# Patient Record
Sex: FEMALE | Race: WHITE | NOT HISPANIC OR LATINO | Employment: FULL TIME | URBAN - METROPOLITAN AREA
[De-identification: names, ages, dates, MRNs, and addresses within clinical notes are randomized per-mention and may not be internally consistent; named-entity substitution may affect disease eponyms.]

---

## 2020-11-02 ENCOUNTER — OFFICE VISIT (OUTPATIENT)
Dept: CARDIOLOGY | Facility: CLINIC | Age: 40
End: 2020-11-02

## 2020-11-02 VITALS
SYSTOLIC BLOOD PRESSURE: 131 MMHG | HEART RATE: 71 BPM | OXYGEN SATURATION: 97 % | DIASTOLIC BLOOD PRESSURE: 85 MMHG | WEIGHT: 272 LBS

## 2020-11-02 DIAGNOSIS — I95.1 AUTONOMIC ORTHOSTATIC HYPOTENSION: ICD-10-CM

## 2020-11-02 DIAGNOSIS — R55 SYNCOPE AND COLLAPSE: Primary | ICD-10-CM

## 2020-11-02 PROCEDURE — 99204 OFFICE O/P NEW MOD 45 MIN: CPT | Performed by: NURSE PRACTITIONER

## 2020-11-02 RX ORDER — ATENOLOL 100 MG/1
TABLET ORAL
COMMUNITY
Start: 2020-10-20

## 2020-11-02 RX ORDER — OXYCODONE HYDROCHLORIDE 10 MG/1
10 TABLET ORAL EVERY 6 HOURS
COMMUNITY
Start: 2020-10-20

## 2020-11-02 RX ORDER — FLUOXETINE HYDROCHLORIDE 40 MG/1
CAPSULE ORAL
COMMUNITY
Start: 2020-10-16

## 2020-11-02 RX ORDER — AMLODIPINE BESYLATE 10 MG/1
5 TABLET ORAL DAILY
COMMUNITY
Start: 2020-10-18 | End: 2020-11-02 | Stop reason: SDUPTHER

## 2020-11-02 RX ORDER — FUROSEMIDE 20 MG/1
TABLET ORAL
COMMUNITY
Start: 2020-10-20

## 2020-11-02 RX ORDER — FLUOXETINE HYDROCHLORIDE 20 MG/1
CAPSULE ORAL
COMMUNITY
Start: 2020-10-21

## 2020-11-02 RX ORDER — QUETIAPINE FUMARATE 200 MG/1
TABLET, FILM COATED ORAL
COMMUNITY
Start: 2020-10-15

## 2020-11-02 RX ORDER — TIZANIDINE 4 MG/1
TABLET ORAL
COMMUNITY
Start: 2020-10-21

## 2020-11-02 RX ORDER — AMLODIPINE BESYLATE 10 MG/1
5 TABLET ORAL DAILY
Qty: 30 TABLET | Refills: 5 | Status: SHIPPED | OUTPATIENT
Start: 2020-11-02

## 2020-11-02 NOTE — PROGRESS NOTES
"Cardiology Office Consultation      Encounter Date:  11/02/2020    Patient ID:   Princess Gomez is a 40 y.o. female.    Reason For Consultation:  Syncope and collapse  Hypotension    History of Present Illness:  Dear  Mis Linton APRN  It was my pleasure to see Princess in new consultation today.  As you are aware, she is a 40-year-old  female with no known history of premature coronary artery disease.  She has had no prior cardiac work-up.  She has known past medical history of hypertension and dependent edema.  The patient reports approximately 5 weeks ago she had rather sudden onset of feeling very weak and tired, decreased energy and noted her blood pressure was low with systolic reading in the 90s, diastolic 40-50s.  She discontinued amlodipine, atenolol and Lasix.  She stated for 5-7 days she continued to have symptoms of feeling weak, tired with low blood pressures.  During 1 of these days she had an episode of syncope as she was getting out of bed to stand up.  Another episode during this timeframe she was sitting on the toilet and \"lost track of time\".  She then came to.  She denies any actual syncope.  She denies any palpitations, chest pain, pressure or tightness.  She denies any shortness of breath at rest, dyspnea with exertion.  No recent edema.  She had 24-hour Holter monitoring performed while she was having symptoms.  Review of Holter monitoring shows significant amount of ectopy that appears to be interpreted as abnormal rhythms.  Underlying rhythm appears to be sinus.  The patient stated she restarted atenolol and Lasix.  She was taking amlodipine as needed when she developed a headache and noted increased blood pressure.      Assessment & Plan    Impressions:  Syncope and collapse  Hypotension  History of hypertension    Recommendations:  Mobile ambulatory monitor x7 days  2D echocardiogram  Decrease amlodipine to 5 mg once daily  Continue atenolol  Follow-up after " testing    Objective:    Vitals:  Vitals:    11/02/20 1412   BP: 131/85   Pulse: 71   SpO2: 97%   Weight: 123 kg (272 lb)       ROS    Review of Systems:  The following systems were reviewed as they relate to the cardiovascular system: Constitutional, Eyes, ENT, Cardiovascular, Respiratory, Gastrointestinal, Integumentary, Neurological, Psychiatric, Hematologic, Endocrine, Musculoskeletal, and Genitourinary. The pertinent cardiovascular findings are reported above with all other cardiovascular points within those systems being negative.    Physical Exam:     General: Alert, cooperative, no distress, appears stated age  Head:  Normocephalic, atraumatic, mucous membranes moist  Eyes:  Conjunctiva/corneas clear, EOM's intact     Neck:  Supple,  no adenopathy;      Lungs: Clear to auscultation bilaterally, no wheezes rhonchi rales are noted  Chest wall: No tenderness  Heart::  Regular rate and rhythm, S1 and S2 normal, no murmur, rub or gallop  Musculoskeletal:   Ambulates freely without assistance  Abdomen: Soft, non-tender, nondistended bowel sounds active  Extremities: No cyanosis, clubbing, or edema  Pulses: 2+ and symmetric all extremities  Skin:  No rashes or lesions  Neuro/psych: A&O x3. CN II through XII are grossly intact with appropriate affect    History of Present Illness    Procedures    Allergies:  Allergies   Allergen Reactions   • Iodine Other (See Comments)     Other         Medication Review:     Current Outpatient Medications:   •  amLODIPine (NORVASC) 10 MG tablet, Take 0.5 tablets by mouth Daily., Disp: 30 tablet, Rfl: 5  •  atenolol (TENORMIN) 100 MG tablet, , Disp: , Rfl:   •  FLUoxetine (PROzac) 20 MG capsule, , Disp: , Rfl:   •  FLUoxetine (PROzac) 40 MG capsule, , Disp: , Rfl:   •  furosemide (LASIX) 20 MG tablet, , Disp: , Rfl:   •  oxyCODONE (ROXICODONE) 10 MG tablet, Take 10 mg by mouth Every 6 (Six) Hours., Disp: , Rfl:   •  QUEtiapine (SEROquel) 200 MG tablet, , Disp: , Rfl:   •   "tiZANidine (ZANAFLEX) 4 MG tablet, , Disp: , Rfl:     Family History:  Family History   Problem Relation Age of Onset   • Hypertension Father        Past Medical History:  History reviewed. No pertinent past medical history.    Past surgical History:  Past Surgical History:   Procedure Laterality Date   • APPENDECTOMY         Social History:  Social History     Socioeconomic History   • Marital status: Single     Spouse name: Not on file   • Number of children: Not on file   • Years of education: Not on file   • Highest education level: Not on file   Tobacco Use   • Smoking status: Never Smoker   • Smokeless tobacco: Never Used   Substance and Sexual Activity   • Alcohol use: Never     Frequency: Never   • Drug use: Never           NOTE: The following portions of the patient's history were reviewed and updated this visit as appropriate: allergies, current medications, past family history, past medical history, past social history, past surgical history and problem list.    EMR Dragon/Transcription:   \"Dictated utilizing Dragon dictation\".   "

## 2020-11-10 ENCOUNTER — APPOINTMENT (OUTPATIENT)
Dept: CARDIOLOGY | Facility: HOSPITAL | Age: 40
End: 2020-11-10

## 2021-11-22 RX ORDER — AMLODIPINE BESYLATE 10 MG/1
TABLET ORAL
Qty: 15 TABLET | OUTPATIENT
Start: 2021-11-22

## 2023-03-08 ENCOUNTER — APPOINTMENT (OUTPATIENT)
Dept: CT IMAGING | Facility: HOSPITAL | Age: 43
End: 2023-03-08
Payer: COMMERCIAL

## 2023-03-08 ENCOUNTER — APPOINTMENT (OUTPATIENT)
Dept: GENERAL RADIOLOGY | Facility: HOSPITAL | Age: 43
End: 2023-03-08
Payer: COMMERCIAL

## 2023-03-08 ENCOUNTER — HOSPITAL ENCOUNTER (EMERGENCY)
Facility: HOSPITAL | Age: 43
Discharge: HOME | End: 2023-03-08
Attending: EMERGENCY MEDICINE | Admitting: EMERGENCY MEDICINE
Payer: COMMERCIAL

## 2023-03-08 VITALS
HEIGHT: 66 IN | WEIGHT: 240 LBS | BODY MASS INDEX: 38.57 KG/M2 | RESPIRATION RATE: 12 BRPM | OXYGEN SATURATION: 96 % | TEMPERATURE: 97.5 F | SYSTOLIC BLOOD PRESSURE: 158 MMHG | DIASTOLIC BLOOD PRESSURE: 89 MMHG | HEART RATE: 82 BPM

## 2023-03-08 DIAGNOSIS — R05.1 ACUTE COUGH: ICD-10-CM

## 2023-03-08 DIAGNOSIS — R42 DIZZINESS: ICD-10-CM

## 2023-03-08 DIAGNOSIS — N20.0 RENAL STONE: ICD-10-CM

## 2023-03-08 DIAGNOSIS — R19.7 DIARRHEA, UNSPECIFIED TYPE: ICD-10-CM

## 2023-03-08 DIAGNOSIS — J18.9 PNEUMONIA OF BOTH LUNGS DUE TO INFECTIOUS ORGANISM, UNSPECIFIED PART OF LUNG: Primary | ICD-10-CM

## 2023-03-08 LAB
ALBUMIN SERPL-MCNC: 4 G/DL (ref 3.5–5.2)
ALBUMIN/GLOB SERPL: 1.6 G/DL
ALP SERPL-CCNC: 78 U/L (ref 39–117)
ALT SERPL W P-5'-P-CCNC: 18 U/L (ref 1–33)
ANION GAP SERPL CALCULATED.3IONS-SCNC: 8.9 MMOL/L (ref 5–15)
AST SERPL-CCNC: 15 U/L (ref 1–32)
B-HCG UR QL: NEGATIVE
BASOPHILS # BLD AUTO: 0.03 10*3/MM3 (ref 0–0.2)
BASOPHILS NFR BLD AUTO: 0.3 % (ref 0–1.5)
BILIRUB SERPL-MCNC: <0.2 MG/DL (ref 0–1.2)
BILIRUB UR QL STRIP: NEGATIVE
BUN SERPL-MCNC: 14 MG/DL (ref 6–20)
BUN/CREAT SERPL: 15.9 (ref 7–25)
CALCIUM SPEC-SCNC: 8.9 MG/DL (ref 8.6–10.5)
CHLORIDE SERPL-SCNC: 113 MMOL/L (ref 98–107)
CLARITY UR: CLEAR
CO2 SERPL-SCNC: 20.1 MMOL/L (ref 22–29)
COLOR UR: YELLOW
CREAT SERPL-MCNC: 0.88 MG/DL (ref 0.57–1)
DEPRECATED RDW RBC AUTO: 43.2 FL (ref 37–54)
EGFRCR SERPLBLD CKD-EPI 2021: 82.7 ML/MIN/1.73
EOSINOPHIL # BLD AUTO: 0.21 10*3/MM3 (ref 0–0.4)
EOSINOPHIL NFR BLD AUTO: 2.2 % (ref 0.3–6.2)
ERYTHROCYTE [DISTWIDTH] IN BLOOD BY AUTOMATED COUNT: 12.9 % (ref 12.3–15.4)
GLOBULIN UR ELPH-MCNC: 2.5 GM/DL
GLUCOSE SERPL-MCNC: 104 MG/DL (ref 65–99)
GLUCOSE UR STRIP-MCNC: NEGATIVE MG/DL
HCT VFR BLD AUTO: 42.4 % (ref 34–46.6)
HGB BLD-MCNC: 13.8 G/DL (ref 12–15.9)
HGB UR QL STRIP.AUTO: NEGATIVE
IMM GRANULOCYTES # BLD AUTO: 0.11 10*3/MM3 (ref 0–0.05)
IMM GRANULOCYTES NFR BLD AUTO: 1.2 % (ref 0–0.5)
KETONES UR QL STRIP: NEGATIVE
LEUKOCYTE ESTERASE UR QL STRIP.AUTO: NEGATIVE
LIPASE SERPL-CCNC: 35 U/L (ref 13–60)
LYMPHOCYTES # BLD AUTO: 1.53 10*3/MM3 (ref 0.7–3.1)
LYMPHOCYTES NFR BLD AUTO: 16.3 % (ref 19.6–45.3)
MCH RBC QN AUTO: 29.2 PG (ref 26.6–33)
MCHC RBC AUTO-ENTMCNC: 32.5 G/DL (ref 31.5–35.7)
MCV RBC AUTO: 89.8 FL (ref 79–97)
MONOCYTES # BLD AUTO: 0.6 10*3/MM3 (ref 0.1–0.9)
MONOCYTES NFR BLD AUTO: 6.4 % (ref 5–12)
NEUTROPHILS NFR BLD AUTO: 6.88 10*3/MM3 (ref 1.7–7)
NEUTROPHILS NFR BLD AUTO: 73.6 % (ref 42.7–76)
NITRITE UR QL STRIP: NEGATIVE
PH UR STRIP.AUTO: 6 [PH] (ref 5–8)
PLATELET # BLD AUTO: 395 10*3/MM3 (ref 140–450)
PMV BLD AUTO: 9.5 FL (ref 6–12)
POTASSIUM SERPL-SCNC: 3.7 MMOL/L (ref 3.5–5.2)
PROT SERPL-MCNC: 6.5 G/DL (ref 6–8.5)
PROT UR QL STRIP: ABNORMAL
RBC # BLD AUTO: 4.72 10*6/MM3 (ref 3.77–5.28)
SODIUM SERPL-SCNC: 142 MMOL/L (ref 136–145)
SP GR UR STRIP: >=1.03 (ref 1–1.03)
UROBILINOGEN UR QL STRIP: ABNORMAL
WBC NRBC COR # BLD: 9.36 10*3/MM3 (ref 3.4–10.8)

## 2023-03-08 PROCEDURE — 71250 CT THORAX DX C-: CPT

## 2023-03-08 PROCEDURE — 71046 X-RAY EXAM CHEST 2 VIEWS: CPT

## 2023-03-08 PROCEDURE — 99283 EMERGENCY DEPT VISIT LOW MDM: CPT

## 2023-03-08 PROCEDURE — 80053 COMPREHEN METABOLIC PANEL: CPT

## 2023-03-08 PROCEDURE — 99284 EMERGENCY DEPT VISIT MOD MDM: CPT

## 2023-03-08 PROCEDURE — 94640 AIRWAY INHALATION TREATMENT: CPT

## 2023-03-08 PROCEDURE — 96374 THER/PROPH/DIAG INJ IV PUSH: CPT

## 2023-03-08 PROCEDURE — 85025 COMPLETE CBC W/AUTO DIFF WBC: CPT

## 2023-03-08 PROCEDURE — G0463 HOSPITAL OUTPT CLINIC VISIT: HCPCS

## 2023-03-08 PROCEDURE — 81003 URINALYSIS AUTO W/O SCOPE: CPT

## 2023-03-08 PROCEDURE — EDLOS

## 2023-03-08 PROCEDURE — 96361 HYDRATE IV INFUSION ADD-ON: CPT

## 2023-03-08 PROCEDURE — 83690 ASSAY OF LIPASE: CPT

## 2023-03-08 PROCEDURE — 25010000002 ONDANSETRON PER 1 MG

## 2023-03-08 PROCEDURE — 81025 URINE PREGNANCY TEST: CPT

## 2023-03-08 RX ORDER — IPRATROPIUM BROMIDE AND ALBUTEROL SULFATE 2.5; .5 MG/3ML; MG/3ML
3 SOLUTION RESPIRATORY (INHALATION) ONCE
Status: COMPLETED | OUTPATIENT
Start: 2023-03-08 | End: 2023-03-08

## 2023-03-08 RX ORDER — ACETAMINOPHEN 500 MG
1000 TABLET ORAL ONCE
Status: COMPLETED | OUTPATIENT
Start: 2023-03-08 | End: 2023-03-08

## 2023-03-08 RX ORDER — AZITHROMYCIN 250 MG/1
TABLET, FILM COATED ORAL
Qty: 6 TABLET | Refills: 0 | Status: SHIPPED | OUTPATIENT
Start: 2023-03-08

## 2023-03-08 RX ORDER — CEFDINIR 300 MG/1
300 CAPSULE ORAL 2 TIMES DAILY
Qty: 14 CAPSULE | Refills: 0 | Status: SHIPPED | OUTPATIENT
Start: 2023-03-08 | End: 2023-03-15

## 2023-03-08 RX ORDER — BROMPHENIRAMINE MALEATE, PSEUDOEPHEDRINE HYDROCHLORIDE, AND DEXTROMETHORPHAN HYDROBROMIDE 2; 30; 10 MG/5ML; MG/5ML; MG/5ML
5 SYRUP ORAL 4 TIMES DAILY PRN
Qty: 473 ML | Refills: 0 | Status: SHIPPED | OUTPATIENT
Start: 2023-03-08

## 2023-03-08 RX ORDER — SODIUM CHLORIDE 0.9 % (FLUSH) 0.9 %
10 SYRINGE (ML) INJECTION AS NEEDED
Status: DISCONTINUED | OUTPATIENT
Start: 2023-03-08 | End: 2023-03-08 | Stop reason: HOSPADM

## 2023-03-08 RX ORDER — ONDANSETRON 2 MG/ML
4 INJECTION INTRAMUSCULAR; INTRAVENOUS ONCE
Status: COMPLETED | OUTPATIENT
Start: 2023-03-08 | End: 2023-03-08

## 2023-03-08 RX ADMIN — ONDANSETRON 4 MG: 2 INJECTION INTRAMUSCULAR; INTRAVENOUS at 18:31

## 2023-03-08 RX ADMIN — ACETAMINOPHEN 1000 MG: 500 TABLET, FILM COATED ORAL at 18:58

## 2023-03-08 RX ADMIN — IPRATROPIUM BROMIDE AND ALBUTEROL SULFATE 3 ML: 2.5; .5 SOLUTION RESPIRATORY (INHALATION) at 18:33

## 2023-03-08 RX ADMIN — SODIUM CHLORIDE 1000 ML: 9 INJECTION, SOLUTION INTRAVENOUS at 18:31

## 2023-03-08 NOTE — ED NOTES
Pt covid + on 2/25. Reports cough and headache and dizziness when walking. Pt pcp sent her over with concerns of dehydration. Pt also c/o diarrhea.

## 2023-03-09 NOTE — FSED PROVIDER NOTE
EMERGENCY DEPARTMENT ENCOUNTER    Room Number:  05/05  Date seen:  3/8/2023  Time seen: 19:00 EST  PCP: Mis Linton APRN  Historian: Patient    HPI:  Chief complaint: Cough  Context:Princess Gomez is a 43 y.o. female who presents to the ED with c/o cough.  Patient states that she tested positive for COVID on February 25.  She also reports that she tested positive again yesterday.  She reports that she has been having a persistent nonproductive cough.  She reports that she has also been having diarrhea since she tested positive for COVID back in February.  She denies any abdominal pain.  She reports that she has been having some dizziness episodes.  She reports that her primary care provider told her to come in today due to her dizziness.  The patient is nontoxic in appearance.    Timing: Intermittent  Duration: Since February 25  Location: Chest  Radiation: Nonradiating  Quality: Dry  Intensity/Severity: Mild to moderate   Associated Symptoms: dizziness cough  Aggravating Factors: Worse with coughing      The patient was placed in a mask in triage, hand hygiene was performed before and after my interaction with the patient.  I wore a mask, safety glasses and gloves during my entire interaction with the patient.    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     Resolved Ambulatory Problems     Diagnosis Date Noted   • No Resolved Ambulatory Problems     No Additional Past Medical History       PAST SURGICAL HISTORY  Past Surgical History:   Procedure Laterality Date   • APPENDECTOMY         FAMILY HISTORY  Family History   Problem Relation Age of Onset   • Hypertension Father        SOCIAL HISTORY  Social History     Socioeconomic History   • Marital status: Single   Tobacco Use   • Smoking status: Never   • Smokeless  tobacco: Never   Substance and Sexual Activity   • Alcohol use: Never   • Drug use: Never       REVIEW OF SYSTEMS  Review of Systems    All systems reviewed and negative except for those discussed in HPI.     PHYSICAL EXAM    I have reviewed the triage vital signs and nursing notes.    ED Triage Vitals [03/08/23 1752]   Temp Heart Rate Resp BP SpO2   97.5 °F (36.4 °C) 89 12 112/75 91 %      Temp src Heart Rate Source Patient Position BP Location FiO2 (%)   Temporal -- -- -- --       Physical Exam  Constitutional:       Appearance: Normal appearance. She is obese.   HENT:      Right Ear: Tympanic membrane, ear canal and external ear normal.      Left Ear: Tympanic membrane, ear canal and external ear normal.      Nose: Nose normal.      Mouth/Throat:      Mouth: Mucous membranes are moist.   Eyes:      Pupils: Pupils are equal, round, and reactive to light.   Cardiovascular:      Rate and Rhythm: Normal rate and regular rhythm.      Pulses: Normal pulses.      Heart sounds: Normal heart sounds.   Pulmonary:      Effort: Pulmonary effort is normal. No respiratory distress.      Breath sounds: Normal breath sounds. No wheezing.   Abdominal:      Palpations: Abdomen is soft.   Musculoskeletal:         General: Normal range of motion.      Cervical back: Normal range of motion.   Skin:     General: Skin is warm.   Neurological:      Mental Status: She is alert.   Psychiatric:         Mood and Affect: Mood normal.         Behavior: Behavior normal.         Vital signs and nursing notes reviewed.        LAB RESULTS  Recent Results (from the past 24 hour(s))   Comprehensive Metabolic Panel    Collection Time: 03/08/23  6:23 PM    Specimen: Blood   Result Value Ref Range    Glucose 104 (H) 65 - 99 mg/dL    BUN 14 6 - 20 mg/dL    Creatinine 0.88 0.57 - 1.00 mg/dL    Sodium 142 136 - 145 mmol/L    Potassium 3.7 3.5 - 5.2 mmol/L    Chloride 113 (H) 98 - 107 mmol/L    CO2 20.1 (L) 22.0 - 29.0 mmol/L    Calcium 8.9 8.6 - 10.5  mg/dL    Total Protein 6.5 6.0 - 8.5 g/dL    Albumin 4.0 3.5 - 5.2 g/dL    ALT (SGPT) 18 1 - 33 U/L    AST (SGOT) 15 1 - 32 U/L    Alkaline Phosphatase 78 39 - 117 U/L    Total Bilirubin <0.2 0.0 - 1.2 mg/dL    Globulin 2.5 gm/dL    A/G Ratio 1.6 g/dL    BUN/Creatinine Ratio 15.9 7.0 - 25.0    Anion Gap 8.9 5.0 - 15.0 mmol/L    eGFR 82.7 >60.0 mL/min/1.73   Lipase    Collection Time: 03/08/23  6:23 PM    Specimen: Blood   Result Value Ref Range    Lipase 35 13 - 60 U/L   CBC Auto Differential    Collection Time: 03/08/23  6:23 PM    Specimen: Blood   Result Value Ref Range    WBC 9.36 3.40 - 10.80 10*3/mm3    RBC 4.72 3.77 - 5.28 10*6/mm3    Hemoglobin 13.8 12.0 - 15.9 g/dL    Hematocrit 42.4 34.0 - 46.6 %    MCV 89.8 79.0 - 97.0 fL    MCH 29.2 26.6 - 33.0 pg    MCHC 32.5 31.5 - 35.7 g/dL    RDW 12.9 12.3 - 15.4 %    RDW-SD 43.2 37.0 - 54.0 fl    MPV 9.5 6.0 - 12.0 fL    Platelets 395 140 - 450 10*3/mm3    Neutrophil % 73.6 42.7 - 76.0 %    Lymphocyte % 16.3 (L) 19.6 - 45.3 %    Monocyte % 6.4 5.0 - 12.0 %    Eosinophil % 2.2 0.3 - 6.2 %    Basophil % 0.3 0.0 - 1.5 %    Immature Grans % 1.2 (H) 0.0 - 0.5 %    Neutrophils, Absolute 6.88 1.70 - 7.00 10*3/mm3    Lymphocytes, Absolute 1.53 0.70 - 3.10 10*3/mm3    Monocytes, Absolute 0.60 0.10 - 0.90 10*3/mm3    Eosinophils, Absolute 0.21 0.00 - 0.40 10*3/mm3    Basophils, Absolute 0.03 0.00 - 0.20 10*3/mm3    Immature Grans, Absolute 0.11 (H) 0.00 - 0.05 10*3/mm3   Urinalysis With Culture If Indicated - Urine, Clean Catch    Collection Time: 03/08/23  7:23 PM    Specimen: Urine, Clean Catch   Result Value Ref Range    Color, UA Yellow Yellow, Straw    Appearance, UA Clear Clear    pH, UA 6.0 5.0 - 8.0    Specific Gravity, UA >=1.030 1.005 - 1.030    Glucose, UA Negative Negative    Ketones, UA Negative Negative    Bilirubin, UA Negative Negative    Blood, UA Negative Negative    Protein, UA Trace (A) Negative    Leuk Esterase, UA Negative Negative    Nitrite, UA  Negative Negative    Urobilinogen, UA 0.2 E.U./dL 0.2 - 1.0 E.U./dL   Pregnancy, Urine - Urine, Clean Catch    Collection Time: 03/08/23  7:23 PM    Specimen: Urine, Clean Catch   Result Value Ref Range    HCG, Urine QL Negative Negative       Ordered the above labs and independently reviewed the results.      RADIOLOGY RESULTS  CT Chest Without Contrast Diagnostic    Result Date: 3/8/2023  CT CHEST WO CONTRAST DIAGNOSTIC Date of Exam: 3/8/2023 6:55 PM EST Indication: X-ray findings right lung base.  Cough. Comparison: None available. Technique: Axial CT images were obtained of the chest without contrast administration.  Sagittal and coronal reconstructions were performed.  Automated exposure control and iterative reconstruction methods were used. Findings: *Heart: Heart size normal without pericardial effusion. *Lymphadenopathy: Mild mediastinal lymphadenopathy. *Lungs: Multifocal patchy infiltrates bilaterally, worse throughout right lung. *Pneumothorax: None. *Bones: No acute fractures or dislocations. No osseous destructive lesions. * *    1.No acute findings. 2.Partially visualized 4 mm left renal stone. 3.Multifocal patchy infiltrates/pneumonia bilaterally, worse throughout right lung. Mild mediastinal lymphadenopathy. Follow-up recommended to document resolution and exclude malignancy. Electronically Signed: Ricebook  3/8/2023 7:26 PM EST  Workstation ID: [id number]    XR Chest PA & Lateral    Result Date: 3/8/2023  XR CHEST PA AND LATERAL Date of Exam: 3/8/2023 6:05 PM EST Indication: cough. Comparison: None available. Findings: LUNGS: 3.2 x 4.4 cm focal consolidation in the right lung base laterally. PNEUMOTHORAX: None.  HEART SIZE: Normal.     1.3.2 x 4.4 cm focal consolidation in the right lung base laterally.  Follow-up recommended to document resolution and exclude malignancy. 2.A few other tiny nodular opacities are identified in right lung laterally.  Electronically Signed: Yovani Ali  3/8/2023  6:30 PM EST  Workstation ID: [id number]         I ordered the above noted radiological studies. Independently reviewed by me and discussed with radiologist.  See dictation above for official radiology interpretation.      Orders placed during this visit:  Orders Placed This Encounter   Procedures   • XR Chest PA & Lateral   • CT Chest Without Contrast Diagnostic   • Comprehensive Metabolic Panel   • Lipase   • CBC Auto Differential   • Urinalysis With Culture If Indicated - Urine, Clean Catch   • Pregnancy, Urine - Urine, Clean Catch   • Orthostatic Vitals   • Insert peripheral IV   • CBC & Differential   • ED Acknowledgement Form Needed;         ED Course as of 03/08/23 2030   Wed Mar 08, 2023   1847 XR Chest PA & Lateral  IMPRESSION:  1.3.2 x 4.4 cm focal consolidation in the right lung base laterally.  Follow-up recommended to document resolution and exclude malignancy.  2.A few other tiny nodular opacities are identified in right lung laterally.        Electronically Signed: Yovani Ali    3/8/2023 6:30 PM EST  [KJ]   1950 CT Chest Without Contrast Diagnostic  IMPRESSION:  1.No acute findings.  2.Partially visualized 4 mm left renal stone.  3.Multifocal patchy infiltrates/pneumonia bilaterally, worse throughout right lung. Mild mediastinal lymphadenopathy. Follow-up recommended to document resolution and exclude malignancy.                    Electronically Signed: Yovani Ali    3/8/2023 7:26 PM EST  [KJ]      ED Course User Index  [KJ] Kiya Barbosa APRN           PROCEDURES    Procedures        MEDICATIONS GIVEN IN ER    Medications   sodium chloride 0.9 % flush 10 mL (has no administration in time range)   sodium chloride 0.9 % bolus 1,000 mL (0 mL Intravenous Stopped 3/8/23 1908)   ondansetron (ZOFRAN) injection 4 mg (4 mg Intravenous Given 3/8/23 1831)   ipratropium-albuterol (DUO-NEB) nebulizer solution 3 mL (3 mL Nebulization Given 3/8/23 1833)   acetaminophen (TYLENOL) tablet 1,000 mg (1,000 mg  Oral Given 3/8/23 1858)         PROGRESS, DATA ANALYSIS, CONSULTS, AND MEDICAL DECISION MAKING    All labs have been independently reviewed by me.  All radiology studies have been reviewed by me.   EKG's independently reviewed by me.  Discussion below represents my analysis of pertinent findings related to patient's condition, differential diagnosis, treatment plan and final disposition.    I rechecked the patient.  I discussed the patient's labs, radiology findings (including all incidental findings), diagnosis, and plan for discharge.  A repeat exam reveals no new worrisome changes from my initial exam findings.  The patient understands that the fact that they are being discharged does not denote that nothing is abnormal, it indicates that no clinical emergency is present and that they must follow-up as directed in order to properly maintain their health.  Follow-up instructions (specifically listed below) and return to ER precautions were given at this time.  I specifically instructed the patient to follow-up with their PCP.  The patient understands and agrees with the plan, and is ready for discharge.  All questions answered.    ED Course as of 03/08/23 2030   Wed Mar 08, 2023   1847 XR Chest PA & Lateral  IMPRESSION:  1.3.2 x 4.4 cm focal consolidation in the right lung base laterally.  Follow-up recommended to document resolution and exclude malignancy.  2.A few other tiny nodular opacities are identified in right lung laterally.        Electronically Signed: Yovani Amanda    3/8/2023 6:30 PM EST  [KJ]   1950 CT Chest Without Contrast Diagnostic  IMPRESSION:  1.No acute findings.  2.Partially visualized 4 mm left renal stone.  3.Multifocal patchy infiltrates/pneumonia bilaterally, worse throughout right lung. Mild mediastinal lymphadenopathy. Follow-up recommended to document resolution and exclude malignancy.                    Electronically Signed: Yovani Amanda    3/8/2023 7:26 PM EST  [KJ]      ED Course  User Index  [KJ] Kiya Barbosa, KINJAL       AS OF 20:30 EST VITALS:    BP - 158/89  HR - 82  TEMP - 97.5 °F (36.4 °C) (Temporal)  02 SATS - 96%    Medical Decision Making  MEDICAL DECISION  Comorbidities: Hypertension, depression  Differentials: Pneumonia, bronchitis, COVID, influenza; this list is not all inclusive and does not constitute the entirety of considered causes    The patient is a 43-year-old female who reports that she has been having a persistent cough since being diagnosed with COVID the end of February.  She reports that she did test positive yesterday as well.  The patient reports that she has been having some diarrhea and was sent in by her primary care provider today due to her intermittent dizziness.  She reports that she feels like she is dehydrated.  She denies any chest pain or shortness of breath.  She reports that she does have some mild discomfort when coughing.  The patient had an IV established and blood work was obtained.  CT scan showed a 4 mm renal stone with multifocal patchy infiltrates representing pneumonia.  They recommended follow-up CT scan for resolution of symptoms.  I discussed the discharge instructions with the patient and discussed the importance of following up with her primary care provider.  I will send her home with antibiotics and Bromfed for cough.  She is to return for any new or worsening symptoms.      I wore protective equipment throughout this patient encounter to include mask. Hand hygiene was performed before donning protective equipment and after removal when leaving the room.    Acute cough: acute illness or injury  Diarrhea, unspecified type: acute illness or injury  Dizziness: acute illness or injury  Pneumonia of both lungs due to infectious organism, unspecified part of lung: acute illness or injury  Amount and/or Complexity of Data Reviewed  Labs: ordered.  Radiology: ordered. Decision-making details documented in ED Course.      Risk  OTC  drugs.  Prescription drug management.              DIAGNOSIS  Final diagnoses:   Pneumonia of both lungs due to infectious organism, unspecified part of lung   Acute cough   Dizziness   Diarrhea, unspecified type   Renal stone         Pt masked in first look. I wore a surgical mask throughout my encounters with the pt. I performed hand hygiene on entry into the pt room and upon exit.     Dictated utilizing Dragon dictation     Note Disclaimer: At [facility], we believe that sharing information builds trust and better relationships. You are receiving this note because you recently visited [facility]. It is possible you will see health information before a provider has talked with you about it. This kind of information can be easy to misunderstand. To help you fully understand what it means for your health, we urge you to discuss this note with your provider.

## 2023-03-23 ENCOUNTER — INPATIENT HOSPITAL (OUTPATIENT)
Dept: URBAN - METROPOLITAN AREA HOSPITAL 76 | Facility: HOSPITAL | Age: 43
End: 2023-03-23
Payer: MEDICARE

## 2023-03-23 DIAGNOSIS — K85.90 ACUTE PANCREATITIS WITHOUT NECROSIS OR INFECTION, UNSPECIFIE: ICD-10-CM

## 2023-03-23 PROCEDURE — 99222 1ST HOSP IP/OBS MODERATE 55: CPT

## 2023-03-24 ENCOUNTER — INPATIENT HOSPITAL (OUTPATIENT)
Dept: URBAN - METROPOLITAN AREA HOSPITAL 76 | Facility: HOSPITAL | Age: 43
End: 2023-03-24

## 2023-03-24 DIAGNOSIS — K85.90 ACUTE PANCREATITIS WITHOUT NECROSIS OR INFECTION, UNSPECIFIE: ICD-10-CM

## 2023-03-24 DIAGNOSIS — K80.20 CALCULUS OF GALLBLADDER WITHOUT CHOLECYSTITIS WITHOUT OBSTRU: ICD-10-CM

## 2023-03-24 DIAGNOSIS — Z90.49 ACQUIRED ABSENCE OF OTHER SPECIFIED PARTS OF DIGESTIVE TRACT: ICD-10-CM

## 2023-03-24 DIAGNOSIS — R10.11 RIGHT UPPER QUADRANT PAIN: ICD-10-CM

## 2023-03-24 DIAGNOSIS — R19.7 DIARRHEA, UNSPECIFIED: ICD-10-CM

## 2023-03-24 DIAGNOSIS — R10.13 EPIGASTRIC PAIN: ICD-10-CM

## 2023-03-24 DIAGNOSIS — D64.9 ANEMIA, UNSPECIFIED: ICD-10-CM

## 2023-03-24 DIAGNOSIS — D72.829 ELEVATED WHITE BLOOD CELL COUNT, UNSPECIFIED: ICD-10-CM

## 2023-03-24 DIAGNOSIS — Z86.16 PERSONAL HISTORY OF COVID-19: ICD-10-CM

## 2023-03-24 PROCEDURE — 99232 SBSQ HOSP IP/OBS MODERATE 35: CPT

## 2024-10-31 ENCOUNTER — CONSULT (OUTPATIENT)
Dept: CARDIOLOGY | Facility: CLINIC | Age: 44
End: 2024-10-31
Payer: COMMERCIAL

## 2024-10-31 VITALS
WEIGHT: 245.4 LBS | BODY MASS INDEX: 39.44 KG/M2 | HEART RATE: 71 BPM | OXYGEN SATURATION: 99 % | SYSTOLIC BLOOD PRESSURE: 127 MMHG | DIASTOLIC BLOOD PRESSURE: 86 MMHG | HEIGHT: 66 IN

## 2024-10-31 DIAGNOSIS — I10 ESSENTIAL HYPERTENSION: Primary | ICD-10-CM

## 2024-10-31 PROCEDURE — 99203 OFFICE O/P NEW LOW 30 MIN: CPT | Performed by: INTERNAL MEDICINE

## 2024-10-31 PROCEDURE — 93000 ELECTROCARDIOGRAM COMPLETE: CPT | Performed by: INTERNAL MEDICINE

## 2024-10-31 RX ORDER — SPIRONOLACTONE 100 MG/1
100 TABLET, FILM COATED ORAL 2 TIMES DAILY
COMMUNITY

## 2024-10-31 RX ORDER — OXYCODONE AND ACETAMINOPHEN 10; 325 MG/1; MG/1
1 TABLET ORAL EVERY 6 HOURS PRN
COMMUNITY

## 2024-10-31 RX ORDER — MONTELUKAST SODIUM 10 MG/1
10 TABLET ORAL NIGHTLY
COMMUNITY

## 2024-10-31 RX ORDER — PROPRANOLOL HCL 20 MG
20 TABLET ORAL 2 TIMES DAILY
COMMUNITY

## 2024-10-31 RX ORDER — LOSARTAN POTASSIUM 25 MG/1
25 TABLET ORAL DAILY
Qty: 90 TABLET | Refills: 3 | Status: SHIPPED | OUTPATIENT
Start: 2024-10-31

## 2024-10-31 RX ORDER — BUPROPION HYDROCHLORIDE 150 MG/1
150 TABLET, EXTENDED RELEASE ORAL 2 TIMES DAILY
COMMUNITY

## 2024-10-31 RX ORDER — LISDEXAMFETAMINE DIMESYLATE 10 MG/1
10 CAPSULE ORAL DAILY
COMMUNITY

## 2024-10-31 NOTE — PROGRESS NOTES
Cardiology Office Visit      Encounter Date:  10/31/2024    Patient ID:   Princess Gomez is a 44 y.o. female.    Reason For Followup:  Hypertension  Poorly controlled hypertension    Brief Clinical History:  Dear Mis Frye APRN    I had the pleasure of seeing Princess Gomez today. As you are well aware, this is a 44 y.o. female prior history of hypertension and poorly controlled blood pressure here for follow-up for further evaluation and treatment options    Interval History:  Denies any exertional symptoms of chest pain shortness of breath dizziness or syncope  Complaining of some nonspecific lower extremity edema that is better with the diuretic therapy  Denies any syncope  No heart failure symptoms      Assessment & Plan    Impressions:  Hypertension  Poorly controlled blood pressure  Twelve-lead EKG with normal sinus rhythm with no acute findings  CT chest with no acute findings  Recommendations:  Patient current medications include propranolol 20 mg p.o. twice daily patient is on atenolol 100 mg p.o. twice daily patient is on Aldactone 100 mg p.o. twice daily  Prior available medical records reviewed and discussed patient  Recommend low-salt diet  Patient is advised to consider decreasing and avoiding 2 different beta-blockers  Patient has been on these medicines for a long time she is not comfortable in stopping the medications  Start patient on losartan 25 mg p.o. once a day  Get a copy of the labs that were done with the primary care physician make sure renal function and potassium is normal  Risk benefits and alternatives reviewed and discussed patient  Eventually will like to decrease the dose of Aldactone and also atenolol in future  Close monitoring of blood pressure at home and write down the blood pressure readings  Follow-up in office in 2 months        Vitals:  Vitals:    10/31/24 0908   BP: 127/86   Pulse: 71   SpO2: 99%   Weight: 111 kg (245 lb 6.4 oz)   Height: 167.6 cm  "(66\")       Physical Exam:    General: Alert, cooperative, no distress, appears stated age  Head:  Normocephalic, atraumatic, mucous membranes moist  Eyes:  Conjunctiva/corneas clear, EOM's intact     Neck:  Supple,  no adenopathy;      Lungs: Clear to auscultation bilaterally, no wheezes rhonchi rales are noted  Chest wall: No tenderness  Heart::  Regular rate and rhythm, S1 and S2 normal, no murmur, rub or gallop  Abdomen: Soft, non-tender, nondistended bowel sounds active  Extremities: No cyanosis, clubbing, or edema  Pulses: 2+ and symmetric all extremities  Skin:  No rashes or lesions  Neuro/psych: A&O x3. CN II through XII are grossly intact with appropriate affect              Lab Results   Component Value Date    GLUCOSE 104 (H) 03/08/2023    BUN 14 03/08/2023    CREATININE 0.88 03/08/2023    EGFR 82.7 03/08/2023    BCR 15.9 03/08/2023    K 3.7 03/08/2023    CO2 20.1 (L) 03/08/2023    CALCIUM 8.9 03/08/2023    ALBUMIN 4.0 03/08/2023    BILITOT <0.2 03/08/2023    AST 15 03/08/2023    ALT 18 03/08/2023        No results found for this or any previous visit.     No results found for: \"CHOL\", \"CHLPL\", \"TRIG\", \"HDL\", \"LDL\", \"LDLDIRECT\"             Objective:          Allergies:  Allergies   Allergen Reactions    Iodine Other (See Comments)     Other         Medication Review:     Current Outpatient Medications:     atenolol (TENORMIN) 100 MG tablet, , Disp: , Rfl:     buPROPion SR (WELLBUTRIN SR) 150 MG 12 hr tablet, Take 1 tablet by mouth 2 (Two) Times a Day., Disp: , Rfl:     Cariprazine HCl (Vraylar) 1.5 MG capsule capsule, Take 1 capsule by mouth Daily., Disp: , Rfl:     esomeprazole (nexIUM) 20 MG capsule, Take 1 capsule by mouth Every Morning Before Breakfast. Take 2 capsules daily., Disp: , Rfl:     lisdexamfetamine dimesylate (Vyvanse) 10 MG capsule, Take 1 capsule by mouth Daily, Disp: , Rfl:     montelukast (SINGULAIR) 10 MG tablet, Take 1 tablet by mouth Every Night., Disp: , Rfl:     " oxyCODONE-acetaminophen (PERCOCET)  MG per tablet, Take 1 tablet by mouth Every 6 (Six) Hours As Needed for Moderate Pain., Disp: , Rfl:     propranolol (INDERAL) 20 MG tablet, Take 1 tablet by mouth 2 (Two) Times a Day., Disp: , Rfl:     QUEtiapine (SEROquel) 200 MG tablet, , Disp: , Rfl:     spironolactone (ALDACTONE) 100 MG tablet, Take 1 tablet by mouth 2 (Two) Times a Day., Disp: , Rfl:     tiZANidine (ZANAFLEX) 4 MG tablet, , Disp: , Rfl:     Family History:  Family History   Problem Relation Age of Onset    Hypertension Father        Past Medical History:  History reviewed. No pertinent past medical history.    Past surgical History:  Past Surgical History:   Procedure Laterality Date    APPENDECTOMY         Social History:  Social History     Socioeconomic History    Marital status: Single   Tobacco Use    Smoking status: Never    Smokeless tobacco: Never   Vaping Use    Vaping status: Never Used   Substance and Sexual Activity    Alcohol use: Never    Drug use: Never    Sexual activity: Not Currently       Review of Systems:  The following systems were reviewed as they relate to the cardiovascular system: Constitutional, Eyes, ENT, Cardiovascular, Respiratory, Gastrointestinal, Integumentary, Neurological, Psychiatric, Hematologic, Endocrine, Musculoskeletal, and Genitourinary. The pertinent cardiovascular findings are reported above with all other cardiovascular points within those systems being negative.    Diagnostic Study Review:     Current Electrocardiogram:    ECG 12 Lead    Date/Time: 10/31/2024 10:14 AM  Performed by: Ajit Lin MD    Authorized by: Ajit Lin MD  Comparison: compared with previous ECG   Similar to previous ECG  Rhythm: sinus rhythm  Rate: normal  BPM: 71  Conduction: conduction normal  QRS axis: normal  Other findings: non-specific ST-T wave changes    Clinical impression: abnormal EKG                NOTE: The following portions of the patient's  history were reviewed and updated this visit as appropriate: allergies, current medications, past family history, past medical history, past social history, past surgical history and problem list.   Answers submitted by the patient for this visit:  Primary Reason for Visit (Submitted on 10/29/2024)  What is the primary reason for your visit?: High Blood Pressure  High Blood Pressure Questionnaire (Submitted on 10/29/2024)  Chief Complaint: Hypertension  Chronicity: chronic  Onset: more than 1 year ago  Progression since onset: unchanged  Condition status: resistant  anxiety: Yes  blurred vision: Yes  malaise/fatigue: Yes  peripheral edema: Yes  Agents associated with hypertension: amphetamines  CAD risks: dyslipidemia, obesity, sedentary lifestyle, stress  Compliance problems: diet, exercise

## 2024-11-08 ENCOUNTER — OFFICE (OUTPATIENT)
Age: 44
End: 2024-11-08

## 2024-11-08 ENCOUNTER — OFFICE (OUTPATIENT)
Dept: URBAN - METROPOLITAN AREA CLINIC 64 | Facility: CLINIC | Age: 44
End: 2024-11-08

## 2024-11-08 VITALS
DIASTOLIC BLOOD PRESSURE: 93 MMHG | WEIGHT: 246 LBS | WEIGHT: 246 LBS | HEIGHT: 64 IN | HEIGHT: 64 IN | HEIGHT: 64 IN | HEART RATE: 75 BPM | HEIGHT: 64 IN | DIASTOLIC BLOOD PRESSURE: 93 MMHG | HEART RATE: 75 BPM | WEIGHT: 246 LBS | HEART RATE: 75 BPM | DIASTOLIC BLOOD PRESSURE: 93 MMHG | DIASTOLIC BLOOD PRESSURE: 93 MMHG | HEIGHT: 64 IN | WEIGHT: 246 LBS | HEIGHT: 64 IN | HEART RATE: 75 BPM | WEIGHT: 246 LBS | SYSTOLIC BLOOD PRESSURE: 136 MMHG | WEIGHT: 246 LBS | SYSTOLIC BLOOD PRESSURE: 136 MMHG | DIASTOLIC BLOOD PRESSURE: 93 MMHG | SYSTOLIC BLOOD PRESSURE: 136 MMHG | HEART RATE: 75 BPM | SYSTOLIC BLOOD PRESSURE: 136 MMHG | SYSTOLIC BLOOD PRESSURE: 136 MMHG | HEART RATE: 75 BPM | DIASTOLIC BLOOD PRESSURE: 93 MMHG | SYSTOLIC BLOOD PRESSURE: 136 MMHG | DIASTOLIC BLOOD PRESSURE: 93 MMHG | WEIGHT: 246 LBS | HEART RATE: 75 BPM | SYSTOLIC BLOOD PRESSURE: 136 MMHG | HEIGHT: 64 IN

## 2024-11-08 DIAGNOSIS — K21.9 GASTRO-ESOPHAGEAL REFLUX DISEASE WITHOUT ESOPHAGITIS: ICD-10-CM

## 2024-11-08 DIAGNOSIS — Z12.11 ENCOUNTER FOR SCREENING FOR MALIGNANT NEOPLASM OF COLON: ICD-10-CM

## 2024-11-08 DIAGNOSIS — R19.7 DIARRHEA, UNSPECIFIED: ICD-10-CM

## 2024-11-08 PROCEDURE — 99214 OFFICE O/P EST MOD 30 MIN: CPT | Performed by: INTERNAL MEDICINE

## 2024-11-12 DIAGNOSIS — I10 ESSENTIAL HYPERTENSION: Primary | ICD-10-CM

## 2025-01-23 ENCOUNTER — OFFICE VISIT (OUTPATIENT)
Dept: CARDIOLOGY | Facility: CLINIC | Age: 45
End: 2025-01-23
Payer: COMMERCIAL

## 2025-01-23 VITALS
HEIGHT: 66 IN | WEIGHT: 246 LBS | DIASTOLIC BLOOD PRESSURE: 88 MMHG | SYSTOLIC BLOOD PRESSURE: 118 MMHG | OXYGEN SATURATION: 98 % | BODY MASS INDEX: 39.53 KG/M2 | HEART RATE: 89 BPM

## 2025-01-23 DIAGNOSIS — I95.1 AUTONOMIC ORTHOSTATIC HYPOTENSION: Primary | ICD-10-CM

## 2025-01-23 DIAGNOSIS — E78.5 HYPERLIPIDEMIA LDL GOAL <70: ICD-10-CM

## 2025-01-23 DIAGNOSIS — I10 ESSENTIAL HYPERTENSION: ICD-10-CM

## 2025-01-23 DIAGNOSIS — R55 SYNCOPE AND COLLAPSE: ICD-10-CM

## 2025-01-23 RX ORDER — LISDEXAMFETAMINE DIMESYLATE 70 MG/1
70 CAPSULE ORAL EVERY MORNING
COMMUNITY

## 2025-01-23 NOTE — PROGRESS NOTES
Cardiology Office Visit      Encounter Date:  01/23/2025    Patient ID:   Princess Gomez is a 44 y.o. female.    Reason For Followup:  Hypertension  Hyperlipidemia    Brief Clinical History:  Dear Mis Frye APRN    I had the pleasure of seeing Princess Gomez today. As you are well aware, this is a 44 y.o. female prior history of hypertension and poorly controlled blood pressure here for follow-up for further evaluation and treatment options    Interval History:  Denies any exertional symptoms of chest pain shortness of breath dizziness or syncope  Denies any syncope  No heart failure symptoms  Intolerant to statins  Home blood pressure readings are much better  Patient was recently started on GLP-1 analogs for possible consideration for weight loss    Assessment & Plan    Impressions:  Hypertension  Poorly controlled blood pressure/pressure is much better controlled  Twelve-lead EKG with normal sinus rhythm with no acute findings  CT chest with no acute findings  Per lipidemia and intolerance to statins  Diabetes mellitus    Recommendations:  Continue current medical therapy  Continue aggressive risk factor modification  Need for regular exercise and weight loss reviewed and discussed patient  Recent labs and medications reviewed and discussed with patient  Renal function is normal  Potassium levels are normal  Lipids are not controlled  Patient is intolerant to statins  Recommend a PCSK9 repeat labs for hyperlipidemia  Continue GLP-1 analogs for weight loss  If there is any low blood pressures with a significant weight loss patient is advised to call back and consider decreasing the dose of the medications  Current medications include losartan 25 mg p.o. once a day patient is on propranolol 20 mg p.o. twice daily patient is on spironolactone 100 mg p.o. twice a day patient is on atenolol 100 mg p.o. once a day  Patient on semaglutide and also patient was started on Repatha this office  "visit  Close monitoring of blood pressure at home and write down the blood pressure readings  Follow-up in office in 6 months        Vitals:  Vitals:    01/23/25 0850   BP: 118/88   Pulse: 89   SpO2: 98%   Weight: 112 kg (246 lb)   Height: 167.6 cm (66\")       Physical Exam:    General: Alert, cooperative, no distress, appears stated age  Head:  Normocephalic, atraumatic, mucous membranes moist  Eyes:  Conjunctiva/corneas clear, EOM's intact     Neck:  Supple,  no adenopathy;      Lungs: Clear to auscultation bilaterally, no wheezes rhonchi rales are noted  Chest wall: No tenderness  Heart::  Regular rate and rhythm, S1 and S2 normal, no murmur, rub or gallop  Abdomen: Soft, non-tender, nondistended bowel sounds active  Extremities: No cyanosis, clubbing, or edema  Pulses: 2+ and symmetric all extremities  Skin:  No rashes or lesions  Neuro/psych: A&O x3. CN II through XII are grossly intact with appropriate affect              Lab Results   Component Value Date    GLUCOSE 104 (H) 03/08/2023    BUN 14 03/08/2023    CREATININE 0.88 03/08/2023    EGFR 82.7 03/08/2023    BCR 15.9 03/08/2023    K 3.7 03/08/2023    CO2 20.1 (L) 03/08/2023    CALCIUM 8.9 03/08/2023    ALBUMIN 4.0 03/08/2023    BILITOT <0.2 03/08/2023    AST 15 03/08/2023    ALT 18 03/08/2023        No results found for this or any previous visit.     No results found for: \"CHOL\", \"CHLPL\", \"TRIG\", \"HDL\", \"LDL\", \"LDLDIRECT\"             Objective:          Allergies:  Allergies   Allergen Reactions    Iodine Other (See Comments)     Other         Medication Review:     Current Outpatient Medications:     atenolol (TENORMIN) 100 MG tablet, , Disp: , Rfl:     buPROPion SR (WELLBUTRIN SR) 150 MG 12 hr tablet, Take 1 tablet by mouth 2 (Two) Times a Day., Disp: , Rfl:     esomeprazole (nexIUM) 20 MG capsule, Take 1 capsule by mouth Every Morning Before Breakfast. Take 2 capsules daily., Disp: , Rfl:     lisdexamfetamine (Vyvanse) 70 MG capsule, Take 1 capsule by " mouth Every Morning, Disp: , Rfl:     losartan (Cozaar) 25 MG tablet, Take 1 tablet by mouth Daily., Disp: 90 tablet, Rfl: 3    montelukast (SINGULAIR) 10 MG tablet, Take 1 tablet by mouth Every Night., Disp: , Rfl:     oxyCODONE-acetaminophen (PERCOCET)  MG per tablet, Take 1 tablet by mouth Every 6 (Six) Hours As Needed for Moderate Pain., Disp: , Rfl:     propranolol (INDERAL) 20 MG tablet, Take 1 tablet by mouth 2 (Two) Times a Day., Disp: , Rfl:     QUEtiapine (SEROquel) 200 MG tablet, , Disp: , Rfl:     Semaglutide-Weight Management 0.25 MG/0.5ML solution auto-injector, Inject 0.5 mL into the appropriate muscle as directed by prescriber 1 (One) Time Per Week., Disp: , Rfl:     spironolactone (ALDACTONE) 100 MG tablet, Take 1 tablet by mouth 2 (Two) Times a Day. (Patient taking differently: Take 1 tablet by mouth Daily.), Disp: , Rfl:     tiZANidine (ZANAFLEX) 4 MG tablet, , Disp: , Rfl:     Evolocumab (REPATHA) solution prefilled syringe injection, Inject 1 mL under the skin into the appropriate area as directed Every 14 (Fourteen) Days., Disp: 1 mL, Rfl: 5    Family History:  Family History   Problem Relation Age of Onset    Hypertension Father        Past Medical History:  History reviewed. No pertinent past medical history.    Past surgical History:  Past Surgical History:   Procedure Laterality Date    APPENDECTOMY         Social History:  Social History     Socioeconomic History    Marital status: Single   Tobacco Use    Smoking status: Never    Smokeless tobacco: Never   Vaping Use    Vaping status: Never Used   Substance and Sexual Activity    Alcohol use: Never    Drug use: Never    Sexual activity: Not Currently       Review of Systems:  The following systems were reviewed as they relate to the cardiovascular system: Constitutional, Eyes, ENT, Cardiovascular, Respiratory, Gastrointestinal, Integumentary, Neurological, Psychiatric, Hematologic, Endocrine, Musculoskeletal, and Genitourinary. The  pertinent cardiovascular findings are reported above with all other cardiovascular points within those systems being negative.    Diagnostic Study Review:     Current Electrocardiogram:  Procedures          NOTE: The following portions of the patient's history were reviewed and updated this visit as appropriate: allergies, current medications, past family history, past medical history, past social history, past surgical history and problem list.

## 2025-01-29 ENCOUNTER — ON CAMPUS - OUTPATIENT (AMBULATORY)
Dept: URBAN - METROPOLITAN AREA HOSPITAL 2 | Facility: HOSPITAL | Age: 45
End: 2025-01-29
Payer: MEDICARE

## 2025-01-29 ENCOUNTER — OFFICE (AMBULATORY)
Dept: URBAN - METROPOLITAN AREA PATHOLOGY 19 | Facility: PATHOLOGY | Age: 45
End: 2025-01-29
Payer: MEDICARE

## 2025-01-29 VITALS
HEART RATE: 105 BPM | RESPIRATION RATE: 14 BRPM | RESPIRATION RATE: 16 BRPM | OXYGEN SATURATION: 100 % | RESPIRATION RATE: 15 BRPM | DIASTOLIC BLOOD PRESSURE: 109 MMHG | RESPIRATION RATE: 18 BRPM | DIASTOLIC BLOOD PRESSURE: 93 MMHG | HEIGHT: 64 IN | SYSTOLIC BLOOD PRESSURE: 148 MMHG | WEIGHT: 239 LBS | SYSTOLIC BLOOD PRESSURE: 127 MMHG | SYSTOLIC BLOOD PRESSURE: 136 MMHG | HEART RATE: 85 BPM | HEART RATE: 86 BPM | SYSTOLIC BLOOD PRESSURE: 146 MMHG | SYSTOLIC BLOOD PRESSURE: 130 MMHG | SYSTOLIC BLOOD PRESSURE: 145 MMHG | DIASTOLIC BLOOD PRESSURE: 125 MMHG | OXYGEN SATURATION: 98 % | DIASTOLIC BLOOD PRESSURE: 71 MMHG | SYSTOLIC BLOOD PRESSURE: 149 MMHG | SYSTOLIC BLOOD PRESSURE: 131 MMHG | RESPIRATION RATE: 20 BRPM | HEART RATE: 91 BPM | OXYGEN SATURATION: 99 % | HEART RATE: 88 BPM | SYSTOLIC BLOOD PRESSURE: 129 MMHG | TEMPERATURE: 96.6 F | HEART RATE: 89 BPM | DIASTOLIC BLOOD PRESSURE: 76 MMHG | DIASTOLIC BLOOD PRESSURE: 78 MMHG

## 2025-01-29 DIAGNOSIS — K63.5 POLYP OF COLON: ICD-10-CM

## 2025-01-29 DIAGNOSIS — Z12.11 ENCOUNTER FOR SCREENING FOR MALIGNANT NEOPLASM OF COLON: ICD-10-CM

## 2025-01-29 DIAGNOSIS — K21.9 GASTRO-ESOPHAGEAL REFLUX DISEASE WITHOUT ESOPHAGITIS: ICD-10-CM

## 2025-01-29 LAB
GI HISTOLOGY: A. DESCENDING COLON: (no result)
GI HISTOLOGY: B. SIGMOID COLON: (no result)
GI HISTOLOGY: PDF REPORT: (no result)

## 2025-01-29 PROCEDURE — 88305 TISSUE EXAM BY PATHOLOGIST: CPT | Performed by: PATHOLOGY

## 2025-01-29 PROCEDURE — 43235 EGD DIAGNOSTIC BRUSH WASH: CPT | Performed by: INTERNAL MEDICINE

## 2025-01-29 PROCEDURE — 45380 COLONOSCOPY AND BIOPSY: CPT | Mod: 33 | Performed by: INTERNAL MEDICINE

## 2025-01-29 RX ADMIN — ONDANSETRON HYDROCHLORIDE 4 MG: 4 SOLUTION ORAL at 12:20

## 2025-01-30 ENCOUNTER — TELEPHONE (OUTPATIENT)
Dept: CARDIOLOGY | Facility: CLINIC | Age: 45
End: 2025-01-30
Payer: COMMERCIAL

## 2025-01-30 RX ORDER — EZETIMIBE 10 MG/1
10 TABLET ORAL DAILY
Qty: 30 TABLET | Refills: 11 | Status: SHIPPED | OUTPATIENT
Start: 2025-01-30

## 2025-01-30 NOTE — TELEPHONE ENCOUNTER
Called and spoke with the patient. Informing the patient of the recommendations commented below from Dr. Lin. Patient acknowledged this information.     ----- Message from Ajit Lin sent at 1/30/2025 12:08 PM EST -----  Please send a prescription for Zetia 10 mg p.o. once a day   Once she is tried Zetia we can repeat lipids and if they are still elevated consider restarting Repatha for prior catheterization  ----- Message -----  From: Justa Mireles MA  Sent: 1/30/2025  11:55 AM EST  To: Ajit Lin MD    Insurance has denied the Repatha for this patient.

## 2025-07-12 ENCOUNTER — HOSPITAL ENCOUNTER (EMERGENCY)
Facility: HOSPITAL | Age: 45
Discharge: HOME | End: 2025-07-12
Attending: EMERGENCY MEDICINE | Admitting: EMERGENCY MEDICINE
Payer: COMMERCIAL

## 2025-07-12 ENCOUNTER — APPOINTMENT (OUTPATIENT)
Dept: GENERAL RADIOLOGY | Facility: HOSPITAL | Age: 45
End: 2025-07-12
Payer: COMMERCIAL

## 2025-07-12 VITALS
BODY MASS INDEX: 43.92 KG/M2 | TEMPERATURE: 98.8 F | RESPIRATION RATE: 20 BRPM | WEIGHT: 263.6 LBS | SYSTOLIC BLOOD PRESSURE: 134 MMHG | OXYGEN SATURATION: 97 % | DIASTOLIC BLOOD PRESSURE: 96 MMHG | HEART RATE: 72 BPM | HEIGHT: 65 IN

## 2025-07-12 DIAGNOSIS — T14.8XXA WOUND INFECTION: Primary | ICD-10-CM

## 2025-07-12 DIAGNOSIS — L08.9 WOUND INFECTION: Primary | ICD-10-CM

## 2025-07-12 DIAGNOSIS — L03.115 CELLULITIS OF RIGHT LOWER LEG: ICD-10-CM

## 2025-07-12 LAB
ALBUMIN SERPL-MCNC: 4.2 G/DL (ref 3.5–5.2)
ALBUMIN/GLOB SERPL: 2.1 G/DL
ALP SERPL-CCNC: 67 U/L (ref 39–117)
ALT SERPL W P-5'-P-CCNC: 21 U/L (ref 1–33)
ANION GAP SERPL CALCULATED.3IONS-SCNC: 9.1 MMOL/L (ref 5–15)
AST SERPL-CCNC: 22 U/L (ref 1–32)
BASOPHILS # BLD AUTO: 0.05 10*3/MM3 (ref 0–0.2)
BASOPHILS NFR BLD AUTO: 0.4 % (ref 0–1.5)
BILIRUB SERPL-MCNC: <0.2 MG/DL (ref 0–1.2)
BUN SERPL-MCNC: 13.7 MG/DL (ref 6–20)
BUN/CREAT SERPL: 9.7 (ref 7–25)
CALCIUM SPEC-SCNC: 8.9 MG/DL (ref 8.6–10.5)
CHLORIDE SERPL-SCNC: 104 MMOL/L (ref 98–107)
CO2 SERPL-SCNC: 23.9 MMOL/L (ref 22–29)
CREAT SERPL-MCNC: 1.41 MG/DL (ref 0.57–1)
DEPRECATED RDW RBC AUTO: 44.8 FL (ref 37–54)
EGFRCR SERPLBLD CKD-EPI 2021: 46.4 ML/MIN/1.73
EOSINOPHIL # BLD AUTO: 0.17 10*3/MM3 (ref 0–0.4)
EOSINOPHIL NFR BLD AUTO: 1.5 % (ref 0.3–6.2)
ERYTHROCYTE [DISTWIDTH] IN BLOOD BY AUTOMATED COUNT: 13.2 % (ref 12.3–15.4)
GLOBULIN UR ELPH-MCNC: 2 GM/DL
GLUCOSE SERPL-MCNC: 108 MG/DL (ref 65–99)
HCT VFR BLD AUTO: 40.1 % (ref 34–46.6)
HGB BLD-MCNC: 12.5 G/DL (ref 12–15.9)
IMM GRANULOCYTES # BLD AUTO: 0.03 10*3/MM3 (ref 0–0.05)
IMM GRANULOCYTES NFR BLD AUTO: 0.3 % (ref 0–0.5)
LYMPHOCYTES # BLD AUTO: 1.95 10*3/MM3 (ref 0.7–3.1)
LYMPHOCYTES NFR BLD AUTO: 17.4 % (ref 19.6–45.3)
MCH RBC QN AUTO: 29.1 PG (ref 26.6–33)
MCHC RBC AUTO-ENTMCNC: 31.2 G/DL (ref 31.5–35.7)
MCV RBC AUTO: 93.5 FL (ref 79–97)
MONOCYTES # BLD AUTO: 0.66 10*3/MM3 (ref 0.1–0.9)
MONOCYTES NFR BLD AUTO: 5.9 % (ref 5–12)
NEUTROPHILS NFR BLD AUTO: 74.5 % (ref 42.7–76)
NEUTROPHILS NFR BLD AUTO: 8.34 10*3/MM3 (ref 1.7–7)
PLATELET # BLD AUTO: 313 10*3/MM3 (ref 140–450)
PMV BLD AUTO: 9.9 FL (ref 6–12)
POTASSIUM SERPL-SCNC: 4 MMOL/L (ref 3.5–5.2)
PROT SERPL-MCNC: 6.2 G/DL (ref 6–8.5)
RBC # BLD AUTO: 4.29 10*6/MM3 (ref 3.77–5.28)
SODIUM SERPL-SCNC: 137 MMOL/L (ref 136–145)
WBC NRBC COR # BLD AUTO: 11.2 10*3/MM3 (ref 3.4–10.8)

## 2025-07-12 PROCEDURE — 90471 IMMUNIZATION ADMIN: CPT | Performed by: NURSE PRACTITIONER

## 2025-07-12 PROCEDURE — 25010000002 HYDROMORPHONE 1 MG/ML SOLUTION: Performed by: NURSE PRACTITIONER

## 2025-07-12 PROCEDURE — 87186 SC STD MICRODIL/AGAR DIL: CPT | Performed by: NURSE PRACTITIONER

## 2025-07-12 PROCEDURE — 85025 COMPLETE CBC W/AUTO DIFF WBC: CPT | Performed by: NURSE PRACTITIONER

## 2025-07-12 PROCEDURE — 96365 THER/PROPH/DIAG IV INF INIT: CPT

## 2025-07-12 PROCEDURE — 87077 CULTURE AEROBIC IDENTIFY: CPT | Performed by: NURSE PRACTITIONER

## 2025-07-12 PROCEDURE — 87154 CUL TYP ID BLD PTHGN 6+ TRGT: CPT | Performed by: NURSE PRACTITIONER

## 2025-07-12 PROCEDURE — 36415 COLL VENOUS BLD VENIPUNCTURE: CPT

## 2025-07-12 PROCEDURE — 25010000002 ONDANSETRON PER 1 MG: Performed by: NURSE PRACTITIONER

## 2025-07-12 PROCEDURE — 99283 EMERGENCY DEPT VISIT LOW MDM: CPT

## 2025-07-12 PROCEDURE — 25010000002 TETANUS-DIPHTH-ACELL PERTUSSIS 5-2.5-18.5 LF-MCG/0.5 SUSPENSION PREFILLED SYRINGE: Performed by: NURSE PRACTITIONER

## 2025-07-12 PROCEDURE — 96376 TX/PRO/DX INJ SAME DRUG ADON: CPT

## 2025-07-12 PROCEDURE — 96375 TX/PRO/DX INJ NEW DRUG ADDON: CPT

## 2025-07-12 PROCEDURE — 87070 CULTURE OTHR SPECIMN AEROBIC: CPT | Performed by: NURSE PRACTITIONER

## 2025-07-12 PROCEDURE — 87205 SMEAR GRAM STAIN: CPT | Performed by: NURSE PRACTITIONER

## 2025-07-12 PROCEDURE — 80053 COMPREHEN METABOLIC PANEL: CPT | Performed by: NURSE PRACTITIONER

## 2025-07-12 PROCEDURE — 90715 TDAP VACCINE 7 YRS/> IM: CPT | Performed by: NURSE PRACTITIONER

## 2025-07-12 PROCEDURE — 87040 BLOOD CULTURE FOR BACTERIA: CPT | Performed by: NURSE PRACTITIONER

## 2025-07-12 PROCEDURE — 25010000002 CEFTRIAXONE PER 250 MG: Performed by: NURSE PRACTITIONER

## 2025-07-12 PROCEDURE — 73590 X-RAY EXAM OF LOWER LEG: CPT

## 2025-07-12 PROCEDURE — 25010000002 KETOROLAC TROMETHAMINE PER 15 MG: Performed by: NURSE PRACTITIONER

## 2025-07-12 RX ORDER — DOXYCYCLINE 100 MG/1
100 CAPSULE ORAL ONCE
Status: COMPLETED | OUTPATIENT
Start: 2025-07-12 | End: 2025-07-12

## 2025-07-12 RX ORDER — SODIUM CHLORIDE 0.9 % (FLUSH) 0.9 %
10 SYRINGE (ML) INJECTION AS NEEDED
Status: DISCONTINUED | OUTPATIENT
Start: 2025-07-12 | End: 2025-07-13 | Stop reason: HOSPADM

## 2025-07-12 RX ORDER — DIAPER,BRIEF,INFANT-TODD,DISP
1 EACH MISCELLANEOUS ONCE
Status: COMPLETED | OUTPATIENT
Start: 2025-07-12 | End: 2025-07-12

## 2025-07-12 RX ORDER — KETOROLAC TROMETHAMINE 30 MG/ML
15 INJECTION, SOLUTION INTRAMUSCULAR; INTRAVENOUS ONCE
Status: COMPLETED | OUTPATIENT
Start: 2025-07-12 | End: 2025-07-12

## 2025-07-12 RX ORDER — HYDROCODONE BITARTRATE AND ACETAMINOPHEN 5; 325 MG/1; MG/1
1 TABLET ORAL EVERY 6 HOURS PRN
Qty: 8 TABLET | Refills: 0 | Status: SHIPPED | OUTPATIENT
Start: 2025-07-12 | End: 2025-07-12 | Stop reason: SDUPTHER

## 2025-07-12 RX ORDER — DOXYCYCLINE 100 MG/1
100 CAPSULE ORAL 2 TIMES DAILY
Qty: 20 CAPSULE | Refills: 0 | Status: SHIPPED | OUTPATIENT
Start: 2025-07-12 | End: 2025-07-16

## 2025-07-12 RX ORDER — ONDANSETRON 2 MG/ML
4 INJECTION INTRAMUSCULAR; INTRAVENOUS ONCE
Status: COMPLETED | OUTPATIENT
Start: 2025-07-12 | End: 2025-07-12

## 2025-07-12 RX ADMIN — ONDANSETRON 4 MG: 2 INJECTION, SOLUTION INTRAMUSCULAR; INTRAVENOUS at 21:07

## 2025-07-12 RX ADMIN — BACITRACIN 0.9 G: 500 OINTMENT TOPICAL at 22:16

## 2025-07-12 RX ADMIN — CEFTRIAXONE SODIUM 1000 MG: 1 INJECTION, POWDER, FOR SOLUTION INTRAMUSCULAR; INTRAVENOUS at 21:31

## 2025-07-12 RX ADMIN — DOXYCYCLINE 100 MG: 100 CAPSULE ORAL at 21:31

## 2025-07-12 RX ADMIN — KETOROLAC TROMETHAMINE 15 MG: 30 INJECTION INTRAMUSCULAR; INTRAVENOUS at 21:07

## 2025-07-12 RX ADMIN — HYDROMORPHONE HYDROCHLORIDE 0.5 MG: 1 INJECTION, SOLUTION INTRAMUSCULAR; INTRAVENOUS; SUBCUTANEOUS at 21:42

## 2025-07-12 RX ADMIN — HYDROMORPHONE HYDROCHLORIDE 0.5 MG: 1 INJECTION, SOLUTION INTRAMUSCULAR; INTRAVENOUS; SUBCUTANEOUS at 22:34

## 2025-07-12 RX ADMIN — TETANUS TOXOID, REDUCED DIPHTHERIA TOXOID AND ACELLULAR PERTUSSIS VACCINE, ADSORBED 0.5 ML: 5; 2.5; 8; 8; 2.5 SUSPENSION INTRAMUSCULAR at 21:07

## 2025-07-13 NOTE — ED NOTES
Irrigated wound RLE with 20 cc sterile water, cleaned with microKlense, applied bacitracin, nonstick dressing and secured with kerlex.  Pt tolerated well.  Educated patient on future wound care s/s of worsening and proper method to clean and dress the wound.  Pt verbalized understanding.

## 2025-07-13 NOTE — DISCHARGE INSTRUCTIONS
Follow-up with primary care.  Return to the emergency department for worsening symptoms.  Take the pain medication you have at home as directed.  Soak with Epsom salt and warm water.  You may wash the area with soap and water and apply antibiotic ointment as necessary.  Apply a nonstick bandage.    Follow-up with your doctor for repeat lab work on Monday or Tuesday of next week    You can start taking the antibiotics orally tomorrow as you were given antibiotics today through your IV

## 2025-07-13 NOTE — FSED PROVIDER NOTE
Subjective   History of Present Illness  45-year-old female presents with wound to right lower extremity.  On Wednesday she was weed eating and injured her leg.  She went inside and cleaned it out with some peroxide and then went back to weed eating.  She has been cleaning the area and dressing the area since then but today noticed some purulent drainage and a rock came out of the wound.  She has some redness in her right anterior lower extremity.  There is no purulent drainage at this time.  Patient is afebrile, heart rate is 82, respiratory rate is 19, SPO2 is 94% on room air.  She does have elevated blood pressure at 171/101.  She does have a history of hypertension.        Review of Systems    History reviewed. No pertinent past medical history.    Allergies   Allergen Reactions    Iodine Other (See Comments)     Other      Statins Myalgia       Past Surgical History:   Procedure Laterality Date    APPENDECTOMY         Family History   Problem Relation Age of Onset    Hypertension Father        Social History     Socioeconomic History    Marital status: Single   Tobacco Use    Smoking status: Never    Smokeless tobacco: Never   Vaping Use    Vaping status: Never Used   Substance and Sexual Activity    Alcohol use: Never    Drug use: Never    Sexual activity: Not Currently           Objective   Physical Exam  Vitals and nursing note reviewed.   Constitutional:       General: She is not in acute distress.     Appearance: Normal appearance. She is obese. She is not ill-appearing, toxic-appearing or diaphoretic.   Cardiovascular:      Rate and Rhythm: Normal rate and regular rhythm.      Heart sounds: Normal heart sounds. No murmur heard.     No friction rub. No gallop.   Pulmonary:      Effort: Pulmonary effort is normal. No respiratory distress.      Breath sounds: Normal breath sounds. No stridor. No wheezing, rhonchi or rales.   Musculoskeletal:         General: Swelling, tenderness and signs of injury present.       Right lower leg: Edema present.   Skin:     Capillary Refill: Capillary refill takes less than 2 seconds.      Findings: Erythema present.      Comments: Patient has an area of redness to the right lower extremity approx 18 cm x 14 cm.     Neurological:      Mental Status: She is alert and oriented to person, place, and time.   Psychiatric:         Mood and Affect: Mood normal.         Behavior: Behavior normal.         Procedures           ED Course  ED Course as of 07/12/25 2246   Sat Jul 12, 2025 2118 WBC(!): 11.20 [SJ]   2118 RBC: 4.29 [SJ]   2118 Hemoglobin: 12.5 [SJ]   2118 Hematocrit: 40.1 [SJ]   2118 MCV: 93.5 [SJ]   2118 MCH: 29.1 [SJ]   2118 MCHC(!): 31.2 [SJ]   2118 RDW: 13.2 [SJ]   2118 RDW-SD: 44.8 [SJ]   2118 MPV: 9.9 []   2118 Platelets: 313 []   2118 Neutrophil Rel %: 74.5 []   2118 Lymphocyte Rel %(!): 17.4 [SJ]   2118 Monocyte Rel %: 5.9 [SJ]   2118 Eosinophil Rel %: 1.5 []   2118 Basophil Rel %: 0.4 []   2118 Immature Granulocyte Rel %: 0.3 []   2118 Neutrophils Absolute(!): 8.34 [SJ]   2118 Lymphocytes Absolute: 1.95 []   2118 Monocytes Absolute: 0.66 []   2118 Eosinophils Absolute: 0.17 []   2118 Basophils Absolute: 0.05 []   2118 Immature Grans, Absolute: 0.03 [SJ]   2136 Glucose(!): 108 [SJ]   2136 BUN: 13.7 [SJ]   2136 Creatinine(!): 1.41 [SJ]   2136 Sodium: 137 [SJ]   2136 Potassium: 4.0 [SJ]   2136 Chloride: 104 [SJ]   2136 CO2: 23.9 [SJ]   2136 Calcium: 8.9 [SJ]   2136 Total Protein: 6.2 [SJ]   2136 Albumin: 4.2 [SJ]   2136 ALT (SGPT): 21 [SJ]   2136 AST (SGOT): 22 [SJ]   2136 Alkaline Phosphatase: 67 [SJ]   2136 Total Bilirubin: <0.2 [SJ]   2136 Globulin: 2.0 [SJ]   2136 A/G Ratio: 2.1 [SJ]   2136 BUN/Creatinine Ratio: 9.7 [SJ]   2136 Anion Gap: 9.1 [SJ]   2136 eGFR(!): 46.4 [SJ]   2243 Details      Reading Physician Reading Date Result Priority  Arslan Anderson MD  [phone]  7/12/2025 STAT    Narrative & Impression  XR TIBIA FIBULA 2 VW RIGHT     Date of  Exam: 7/12/2025 9:11 PM EDT     Indication: foreign body in leg since wednesday, she removed the rock today     Comparison: None available.     Findings:  There is no evidence of fracture or dislocation. No focal lesions identified. No erosions. No periostitis. No focal soft tissue abnormalities identified.     IMPRESSION:  Impression:  No acute abnormality.              Electronically Signed: Arslan Anderson MD    7/12/2025 10:20 PM EDT    Workstation ID: [id number]      Exam Ended: 07/12/25 21:21 EDT     [SJ]      ED Course User Index  [SJ] Mi Lowe APRN                                           Medical Decision Making  45-year-old female presents with wound to right lower extremity.  On Wednesday she was weed eating and injured her leg.  She went inside and cleaned it out with some peroxide and then went back to weed eating.  She has been cleaning the area and dressing the area since then but today noticed some purulent drainage and a rock came out of the wound.  She has some redness in her right anterior lower extremity.  There is no purulent drainage at this time.  Patient is afebrile, heart rate is 82, respiratory rate is 19, SPO2 is 94% on room air.  She does have elevated blood pressure at 171/101.  She does have a history of hypertension.  Different diagnoses include cellulitis, gangrene, wound infection, retained foreign body.  This does not constitute all considered diagnoses.  Patient's white count slightly elevated at 11.2.  Her glucose is 108.  Her creatinine is 1.41.  BUN is 13.7.  GFR is 46.4.  X-ray showed no acute abnormality.  Patient removed a rock from her right lower extremity today.  She was injured initially on Wednesday while weed eating.  She was given Rocephin 1 g IV here and a total of 1 mg of Dilaudid IV for pain.  She did take a Percocet at home which she reported did not work.  She is prescribed Percocet daily, 10 mg every 6 hours.  She was advised to continue taking that medication  at home.  Reasons for return were discussed.  Patient verbalized understanding.  I advised Epsom salt soaks, cleansing the area as needed and applying antibiotic ointment and a nonstick bandage.  Reasons for return were discussed with patient verbalized understanding.    Problems Addressed:  Cellulitis of right lower leg: complicated acute illness or injury  Wound infection: complicated acute illness or injury    Amount and/or Complexity of Data Reviewed  Labs: ordered. Decision-making details documented in ED Course.  Radiology: ordered.    Risk  OTC drugs.  Prescription drug management.        Final diagnoses:   Wound infection   Cellulitis of right lower leg       ED Disposition  ED Disposition       ED Disposition   Discharge    Condition   Stable    Comment   --               Mis Linton, APRN  [address] [facility] [place] [address] [phone]    Call            Medication List        New Prescriptions      doxycycline 100 MG capsule  Commonly known as: MONODOX  Take 1 capsule by mouth 2 (Two) Times a Day for 10 days.            Changed      spironolactone 100 MG tablet  Commonly known as: ALDACTONE  What changed: when to take this               Where to Get Your Medications        These medications were sent to [facility]/pharm[facility] #[id number] - [place], [place] - [address] [place] [address] - [phone]  - [phone] [address] [place] [address]      Hours: 24-hours Phone: [phone]   doxycycline 100 MG capsule

## 2025-07-15 LAB
BACTERIA BLD CULT: NORMAL
BACTERIA SPEC AEROBE CULT: ABNORMAL
BOTTLE TYPE: NORMAL
GRAM STN SPEC: ABNORMAL
GRAM STN SPEC: ABNORMAL

## 2025-07-15 RX ORDER — SULFAMETHOXAZOLE AND TRIMETHOPRIM 800; 160 MG/1; MG/1
1 TABLET ORAL 2 TIMES DAILY
Qty: 14 TABLET | Refills: 0 | Status: SHIPPED | OUTPATIENT
Start: 2025-07-15 | End: 2025-07-18 | Stop reason: HOSPADM

## 2025-07-16 ENCOUNTER — HOSPITAL ENCOUNTER (OUTPATIENT)
Facility: HOSPITAL | Age: 45
Setting detail: OBSERVATION
Discharge: HOME | End: 2025-07-18
Attending: EMERGENCY MEDICINE | Admitting: EMERGENCY MEDICINE
Payer: COMMERCIAL

## 2025-07-16 ENCOUNTER — APPOINTMENT (OUTPATIENT)
Dept: GENERAL RADIOLOGY | Facility: HOSPITAL | Age: 45
End: 2025-07-16
Payer: COMMERCIAL

## 2025-07-16 DIAGNOSIS — L03.116 CELLULITIS OF LEFT LOWER EXTREMITY: Primary | ICD-10-CM

## 2025-07-16 PROBLEM — L03.90 CELLULITIS: Status: ACTIVE | Noted: 2025-07-16

## 2025-07-16 LAB
ANION GAP SERPL CALCULATED.3IONS-SCNC: 9.3 MMOL/L (ref 5–15)
BASOPHILS # BLD AUTO: 0.04 10*3/MM3 (ref 0–0.2)
BASOPHILS NFR BLD AUTO: 0.7 % (ref 0–1.5)
BUN SERPL-MCNC: 13.5 MG/DL (ref 6–20)
BUN/CREAT SERPL: 12.5 (ref 7–25)
CALCIUM SPEC-SCNC: 8.9 MG/DL (ref 8.6–10.5)
CHLORIDE SERPL-SCNC: 104 MMOL/L (ref 98–107)
CO2 SERPL-SCNC: 25.7 MMOL/L (ref 22–29)
CREAT SERPL-MCNC: 1.08 MG/DL (ref 0.57–1)
DEPRECATED RDW RBC AUTO: 44 FL (ref 37–54)
EGFRCR SERPLBLD CKD-EPI 2021: 63.9 ML/MIN/1.73
EOSINOPHIL # BLD AUTO: 0.22 10*3/MM3 (ref 0–0.4)
EOSINOPHIL NFR BLD AUTO: 3.6 % (ref 0.3–6.2)
ERYTHROCYTE [DISTWIDTH] IN BLOOD BY AUTOMATED COUNT: 13 % (ref 12.3–15.4)
GLUCOSE SERPL-MCNC: 107 MG/DL (ref 65–99)
HCT VFR BLD AUTO: 36.6 % (ref 34–46.6)
HGB BLD-MCNC: 11.4 G/DL (ref 12–15.9)
IMM GRANULOCYTES # BLD AUTO: 0.01 10*3/MM3 (ref 0–0.05)
IMM GRANULOCYTES NFR BLD AUTO: 0.2 % (ref 0–0.5)
LYMPHOCYTES # BLD AUTO: 1.53 10*3/MM3 (ref 0.7–3.1)
LYMPHOCYTES NFR BLD AUTO: 24.9 % (ref 19.6–45.3)
MCH RBC QN AUTO: 28.8 PG (ref 26.6–33)
MCHC RBC AUTO-ENTMCNC: 31.1 G/DL (ref 31.5–35.7)
MCV RBC AUTO: 92.4 FL (ref 79–97)
MONOCYTES # BLD AUTO: 0.45 10*3/MM3 (ref 0.1–0.9)
MONOCYTES NFR BLD AUTO: 7.3 % (ref 5–12)
NEUTROPHILS NFR BLD AUTO: 3.9 10*3/MM3 (ref 1.7–7)
NEUTROPHILS NFR BLD AUTO: 63.3 % (ref 42.7–76)
NRBC BLD AUTO-RTO: 0 /100 WBC (ref 0–0.2)
PLATELET # BLD AUTO: 305 10*3/MM3 (ref 140–450)
PMV BLD AUTO: 10.2 FL (ref 6–12)
POTASSIUM SERPL-SCNC: 4.6 MMOL/L (ref 3.5–5.2)
RBC # BLD AUTO: 3.96 10*6/MM3 (ref 3.77–5.28)
SODIUM SERPL-SCNC: 139 MMOL/L (ref 136–145)
WBC NRBC COR # BLD AUTO: 6.15 10*3/MM3 (ref 3.4–10.8)

## 2025-07-16 PROCEDURE — 36415 COLL VENOUS BLD VENIPUNCTURE: CPT

## 2025-07-16 PROCEDURE — G0378 HOSPITAL OBSERVATION PER HR: HCPCS

## 2025-07-16 PROCEDURE — 87040 BLOOD CULTURE FOR BACTERIA: CPT | Performed by: NURSE PRACTITIONER

## 2025-07-16 PROCEDURE — 85025 COMPLETE CBC W/AUTO DIFF WBC: CPT | Performed by: NURSE PRACTITIONER

## 2025-07-16 PROCEDURE — 25010000002 CEFEPIME PER 500 MG: Performed by: NURSE PRACTITIONER

## 2025-07-16 PROCEDURE — 96365 THER/PROPH/DIAG IV INF INIT: CPT

## 2025-07-16 PROCEDURE — 25010000002 ONDANSETRON PER 1 MG: Performed by: NURSE PRACTITIONER

## 2025-07-16 PROCEDURE — 73590 X-RAY EXAM OF LOWER LEG: CPT

## 2025-07-16 PROCEDURE — 96375 TX/PRO/DX INJ NEW DRUG ADDON: CPT

## 2025-07-16 PROCEDURE — 99285 EMERGENCY DEPT VISIT HI MDM: CPT

## 2025-07-16 PROCEDURE — 80048 BASIC METABOLIC PNL TOTAL CA: CPT | Performed by: NURSE PRACTITIONER

## 2025-07-16 PROCEDURE — 25010000002 MORPHINE PER 10 MG: Performed by: NURSE PRACTITIONER

## 2025-07-16 RX ORDER — FLUOXETINE HYDROCHLORIDE 40 MG/1
40 CAPSULE ORAL EVERY EVENING
COMMUNITY

## 2025-07-16 RX ORDER — BISACODYL 5 MG/1
5 TABLET, DELAYED RELEASE ORAL DAILY PRN
Status: DISCONTINUED | OUTPATIENT
Start: 2025-07-16 | End: 2025-07-18 | Stop reason: HOSPADM

## 2025-07-16 RX ORDER — SODIUM CHLORIDE 0.9 % (FLUSH) 0.9 %
10 SYRINGE (ML) INJECTION AS NEEDED
Status: DISCONTINUED | OUTPATIENT
Start: 2025-07-16 | End: 2025-07-18 | Stop reason: HOSPADM

## 2025-07-16 RX ORDER — MORPHINE SULFATE 2 MG/ML
2 INJECTION, SOLUTION INTRAMUSCULAR; INTRAVENOUS ONCE
Status: COMPLETED | OUTPATIENT
Start: 2025-07-16 | End: 2025-07-16

## 2025-07-16 RX ORDER — SODIUM CHLORIDE 9 MG/ML
40 INJECTION, SOLUTION INTRAVENOUS AS NEEDED
Status: DISCONTINUED | OUTPATIENT
Start: 2025-07-16 | End: 2025-07-18 | Stop reason: HOSPADM

## 2025-07-16 RX ORDER — DIPHENOXYLATE HYDROCHLORIDE AND ATROPINE SULFATE 2.5; .025 MG/1; MG/1
1 TABLET ORAL 4 TIMES DAILY PRN
COMMUNITY

## 2025-07-16 RX ORDER — LOSARTAN POTASSIUM 25 MG/1
25 TABLET ORAL DAILY
Status: DISCONTINUED | OUTPATIENT
Start: 2025-07-17 | End: 2025-07-18 | Stop reason: HOSPADM

## 2025-07-16 RX ORDER — BUPROPION HYDROCHLORIDE 150 MG/1
150 TABLET, EXTENDED RELEASE ORAL 2 TIMES DAILY
Status: DISCONTINUED | OUTPATIENT
Start: 2025-07-16 | End: 2025-07-18 | Stop reason: HOSPADM

## 2025-07-16 RX ORDER — ONDANSETRON 4 MG/1
4 TABLET, ORALLY DISINTEGRATING ORAL EVERY 6 HOURS PRN
Status: DISCONTINUED | OUTPATIENT
Start: 2025-07-16 | End: 2025-07-16

## 2025-07-16 RX ORDER — ONDANSETRON 2 MG/ML
4 INJECTION INTRAMUSCULAR; INTRAVENOUS EVERY 6 HOURS PRN
Status: DISCONTINUED | OUTPATIENT
Start: 2025-07-16 | End: 2025-07-16

## 2025-07-16 RX ORDER — ONDANSETRON 2 MG/ML
4 INJECTION INTRAMUSCULAR; INTRAVENOUS ONCE
Status: COMPLETED | OUTPATIENT
Start: 2025-07-16 | End: 2025-07-16

## 2025-07-16 RX ORDER — QUETIAPINE FUMARATE 100 MG/1
100 TABLET, FILM COATED ORAL NIGHTLY
Status: DISCONTINUED | OUTPATIENT
Start: 2025-07-16 | End: 2025-07-16

## 2025-07-16 RX ORDER — OXYCODONE HYDROCHLORIDE 5 MG/1
10 TABLET ORAL EVERY 4 HOURS PRN
Refills: 0 | Status: DISCONTINUED | OUTPATIENT
Start: 2025-07-16 | End: 2025-07-18 | Stop reason: HOSPADM

## 2025-07-16 RX ORDER — PANTOPRAZOLE SODIUM 40 MG/1
40 TABLET, DELAYED RELEASE ORAL 2 TIMES DAILY
Status: DISCONTINUED | OUTPATIENT
Start: 2025-07-16 | End: 2025-07-18 | Stop reason: HOSPADM

## 2025-07-16 RX ORDER — POLYETHYLENE GLYCOL 3350 17 G/17G
17 POWDER, FOR SOLUTION ORAL DAILY PRN
Status: DISCONTINUED | OUTPATIENT
Start: 2025-07-16 | End: 2025-07-18 | Stop reason: HOSPADM

## 2025-07-16 RX ORDER — BISACODYL 10 MG
10 SUPPOSITORY, RECTAL RECTAL DAILY PRN
Status: DISCONTINUED | OUTPATIENT
Start: 2025-07-16 | End: 2025-07-18 | Stop reason: HOSPADM

## 2025-07-16 RX ORDER — AMOXICILLIN 250 MG
2 CAPSULE ORAL 2 TIMES DAILY PRN
Status: DISCONTINUED | OUTPATIENT
Start: 2025-07-16 | End: 2025-07-18 | Stop reason: HOSPADM

## 2025-07-16 RX ORDER — QUETIAPINE FUMARATE 100 MG/1
400 TABLET, FILM COATED ORAL NIGHTLY
Status: DISCONTINUED | OUTPATIENT
Start: 2025-07-16 | End: 2025-07-18 | Stop reason: HOSPADM

## 2025-07-16 RX ORDER — TRIAMCINOLONE ACETONIDE 1 MG/G
1 CREAM TOPICAL 2 TIMES DAILY
COMMUNITY

## 2025-07-16 RX ORDER — SODIUM CHLORIDE 0.9 % (FLUSH) 0.9 %
10 SYRINGE (ML) INJECTION EVERY 12 HOURS SCHEDULED
Status: DISCONTINUED | OUTPATIENT
Start: 2025-07-16 | End: 2025-07-18 | Stop reason: HOSPADM

## 2025-07-16 RX ORDER — ALUMINA, MAGNESIA, AND SIMETHICONE 2400; 2400; 240 MG/30ML; MG/30ML; MG/30ML
15 SUSPENSION ORAL EVERY 6 HOURS PRN
Status: DISCONTINUED | OUTPATIENT
Start: 2025-07-16 | End: 2025-07-17

## 2025-07-16 RX ORDER — PROPRANOLOL HCL 20 MG
20 TABLET ORAL 2 TIMES DAILY
Status: DISCONTINUED | OUTPATIENT
Start: 2025-07-16 | End: 2025-07-18 | Stop reason: HOSPADM

## 2025-07-16 RX ORDER — ATENOLOL 50 MG/1
100 TABLET ORAL 2 TIMES DAILY
Status: DISCONTINUED | OUTPATIENT
Start: 2025-07-16 | End: 2025-07-18 | Stop reason: HOSPADM

## 2025-07-16 RX ORDER — QUETIAPINE FUMARATE 100 MG/1
100 TABLET, FILM COATED ORAL NIGHTLY
COMMUNITY

## 2025-07-16 RX ADMIN — OXYCODONE 10 MG: 5 TABLET ORAL at 14:55

## 2025-07-16 RX ADMIN — Medication 10 ML: at 21:00

## 2025-07-16 RX ADMIN — TIZANIDINE 4 MG: 4 TABLET ORAL at 15:33

## 2025-07-16 RX ADMIN — Medication 10 ML: at 20:59

## 2025-07-16 RX ADMIN — ATENOLOL 100 MG: 50 TABLET ORAL at 20:59

## 2025-07-16 RX ADMIN — QUETIAPINE FUMARATE 400 MG: 100 TABLET ORAL at 20:58

## 2025-07-16 RX ADMIN — ONDANSETRON 4 MG: 2 INJECTION, SOLUTION INTRAMUSCULAR; INTRAVENOUS at 12:37

## 2025-07-16 RX ADMIN — BUPROPION HYDROCHLORIDE 150 MG: 150 TABLET, FILM COATED, EXTENDED RELEASE ORAL at 20:59

## 2025-07-16 RX ADMIN — CEFEPIME 2000 MG: 2 INJECTION, POWDER, FOR SOLUTION INTRAVENOUS at 13:05

## 2025-07-16 RX ADMIN — MORPHINE SULFATE 2 MG: 2 INJECTION, SOLUTION INTRAMUSCULAR; INTRAVENOUS at 12:37

## 2025-07-16 RX ADMIN — FLUOXETINE 40 MG: 20 CAPSULE ORAL at 20:59

## 2025-07-16 RX ADMIN — OXYCODONE 10 MG: 5 TABLET ORAL at 19:32

## 2025-07-16 RX ADMIN — PANTOPRAZOLE SODIUM 40 MG: 40 TABLET, DELAYED RELEASE ORAL at 20:59

## 2025-07-16 RX ADMIN — PROPRANOLOL HYDROCHLORIDE 20 MG: 20 TABLET ORAL at 20:59

## 2025-07-16 NOTE — PLAN OF CARE
Goal Outcome Evaluation:  Plan of Care Reviewed With: patient           Outcome Evaluation: New admission here with LLE wound. further plan pending providers orders. continue to monitor

## 2025-07-16 NOTE — Clinical Note
July 17, 2025     Patient: Princess Gomez   YOB: 1980   Date of Visit: 7/16/2025       To Whom It May Concern:    It is my medical opinion that Princess Gomez {Work release (duty restriction):91813}.           Sincerely,        No name on file    CC: No Recipients

## 2025-07-16 NOTE — LETTER
July 18, 2025     Patient: Princess Gomez   YOB: 1980   Date of Visit: 7/16/2025       To Whom It May Concern:    It is my medical opinion that Princess Gomez {Work release (duty restriction):00288}.           Sincerely,        No name on file    CC: No Recipients

## 2025-07-16 NOTE — ED PROVIDER NOTES
Subjective   History of Present Illness  Chief complaint: leg pain      Context: 45-year-old female presents with complaints of left lower extremity pain after she was hit with a rock while mowing.  She was seen at a freestanding ER and started on doxycycline states she was switched to Bactrim yesterday after results of her blood culture and wound culture.  Continues to complain of pain and swelling with subjective fever.        PCP: jia             Review of Systems   Constitutional:  Positive for chills.       Past Medical History:   Diagnosis Date    Anxiety     Binge eating disorder     Chronic back pain     Depression     GERD (gastroesophageal reflux disease)     Hypertension     Kidney stones        Allergies   Allergen Reactions    Iodine Other (See Comments)     Other      Statins Myalgia       Past Surgical History:   Procedure Laterality Date    APPENDECTOMY      EXTRACORPOREAL SHOCKWAVE LITHOTRIPSY (ESWL), STENT INSERTION/REMOVAL      LAPAROSCOPIC CHOLECYSTECTOMY         Family History   Problem Relation Age of Onset    Hypertension Father        Social History     Socioeconomic History    Marital status: Single   Tobacco Use    Smoking status: Never    Smokeless tobacco: Never   Vaping Use    Vaping status: Never Used   Substance and Sexual Activity    Alcohol use: Never    Drug use: Never    Sexual activity: Not Currently           Objective   Physical Exam    Vital signs and triage nurse note reviewed.  Constitutional: Awake, alert; well-developed and well-nourished. No acute distress is noted.  Uncomfortable  HEENT: Normocephalic, atraumatic; with intact EOM; oropharynx is pink and moist without exudate or erythema.  Neck: Supple   Cardiovascular: Regular rate and rhythm, normal S1-S2.  Pulmonary: Respiratory effort regular nonlabored, breath sounds clear to auscultation all fields.  Abdomen: Soft, nontender nondistended with normoactive bowel sounds; no rebound or guarding.  Musculoskeletal:  Independent range of motion of all extremities .  1 cm x 1 cm wound noted to the anterior left lower extremity; does have some surrounding cellulitic changes and serous drainage  Neuro: Alert oriented x3, speech is clear and appropriate, GCS 15  Skin:  Fleshtone warm, dry, intact; no erythematous or petechial rash or lesion      Procedures           ED Course  ED Course as of 07/16/25 1820   Wed Jul 16, 2025   1230 Still waiting for labs to be collected [JW]   1326 Spoke with Micha for admission [JW]      ED Course User Index  [JW] Audra Luna, KINJAL                                             Labs Reviewed   BASIC METABOLIC PANEL - Abnormal; Notable for the following components:       Result Value    Glucose 107 (*)     Creatinine 1.08 (*)     All other components within normal limits    Narrative:     GFR Categories in Chronic Kidney Disease (CKD)              GFR Category          GFR (mL/min/1.73)    Interpretation  G1                    90 or greater        Normal or high (1)  G2                    60-89                Mild decrease (1)  G3a                   45-59                Mild to moderate decrease  G3b                   30-44                Moderate to severe decrease  G4                    15-29                Severe decrease  G5                    14 or less           Kidney failure    (1)In the absence of evidence of kidney disease, neither GFR category G1 or G2 fulfill the criteria for CKD.    eGFR calculation 2021 CKD-EPI creatinine equation, which does not include race as a factor   CBC WITH AUTO DIFFERENTIAL - Abnormal; Notable for the following components:    Hemoglobin 11.4 (*)     MCHC 31.1 (*)     All other components within normal limits   BLOOD CULTURE   BLOOD CULTURE   CBC AND DIFFERENTIAL    Narrative:     The following orders were created for panel order CBC & Differential.  Procedure                               Abnormality         Status                     ---------                                -----------         ------                     CBC Auto Differential[[id number]]        Abnormal            Final result                 Please view results for these tests on the individual orders.     Medications   atenolol (TENORMIN) tablet 100 mg (has no administration in time range)   buPROPion SR (WELLBUTRIN SR) 12 hr tablet 150 mg (has no administration in time range)   pantoprazole (PROTONIX) EC tablet 40 mg (has no administration in time range)   FLUoxetine (PROzac) capsule 40 mg (has no administration in time range)   losartan (COZAAR) tablet 25 mg (has no administration in time range)   propranolol (INDERAL) tablet 20 mg (has no administration in time range)   QUEtiapine (SEROquel) tablet 400 mg (has no administration in time range)   tiZANidine (ZANAFLEX) tablet 4 mg (4 mg Oral Given 7/16/25 1533)   oxyCODONE (ROXICODONE) immediate release tablet 10 mg (10 mg Oral Given 7/16/25 1455)   sodium chloride 0.9 % flush 10 mL (has no administration in time range)   sodium chloride 0.9 % flush 10 mL (has no administration in time range)   sodium chloride 0.9 % infusion 40 mL (has no administration in time range)   aluminum-magnesium hydroxide-simethicone (MAALOX MAX) 400-400-40 MG/5ML suspension 15 mL (has no administration in time range)   sennosides-docusate (PERICOLACE) 8.6-50 MG per tablet 2 tablet (has no administration in time range)     And   polyethylene glycol (MIRALAX) packet 17 g (has no administration in time range)     And   bisacodyl (DULCOLAX) EC tablet 5 mg (has no administration in time range)     And   bisacodyl (DULCOLAX) suppository 10 mg (has no administration in time range)   cefTRIAXone (ROCEPHIN) 1,000 mg in sodium chloride 0.9 % 100 mL MBP (has no administration in time range)   cefepime 2000 mg IVPB in 100 mL NS (MBP) (0 mg Intravenous Stopped 7/16/25 1400)   ondansetron (ZOFRAN) injection 4 mg (4 mg Intravenous Given 7/16/25 1237)   morphine injection 2 mg (2 mg  Intravenous Given 7/16/25 1237)     XR Tibia Fibula 2 View Left  Result Date: 7/16/2025  Impression: No acute finding of the left tibia and fibula. Electronically Signed: Kelly Mariscal MD  7/16/2025 12:07 PM EDT  Workstation ID: [id number]    Prior to Admission medications    Medication Sig Start Date End Date Taking? Authorizing Provider   atenolol (TENORMIN) 100 MG tablet Take 1 tablet by mouth Daily. 10/20/20  Yes Momo Kimball MD   buPROPion SR (WELLBUTRIN SR) 150 MG 12 hr tablet Take 1 tablet by mouth 2 (Two) Times a Day.   Yes Momo Kimball MD   esomeprazole (nexIUM) 40 MG capsule Take 1 capsule by mouth 2 (Two) Times a Day.   Yes Momo Kimball MD   FLUoxetine (PROzac) 40 MG capsule Take 1 capsule by mouth Daily.   Yes Momo Kimball MD   losartan (Cozaar) 25 MG tablet Take 1 tablet by mouth Daily. 10/31/24  Yes Ajit Lin MD   montelukast (SINGULAIR) 10 MG tablet Take 1 tablet by mouth Every Night.   Yes Momo Kimball MD   oxyCODONE-acetaminophen (PERCOCET)  MG per tablet Take 1 tablet by mouth Every 6 (Six) Hours As Needed for Moderate Pain.   Yes Momo Kimball MD   propranolol (INDERAL) 20 MG tablet Take 1 tablet by mouth 2 (Two) Times a Day.   Yes Momo Kimball MD   QUEtiapine (SEROquel) 100 MG tablet Take 1 tablet by mouth Every Night. In addition to 200mg tablet for a total dose of 300mg nightly   Yes Momo Kimball MD   QUEtiapine (SEROquel) 200 MG tablet 1 tablet Every Night. In addition to 100mg tablet for a total dose of 300mg nightly 10/15/20  Yes Momo Kimball MD   sulfamethoxazole-trimethoprim (BACTRIM DS,SEPTRA DS) 800-160 MG per tablet Take 1 tablet by mouth 2 (Two) Times a Day for 7 days. 7/15/25 7/22/25 Yes Emmy Luna APRN   triamcinolone (KENALOG) 0.1 % cream Apply 1 Application topically to the appropriate area as directed 2 (Two) Times a Day.   Yes Moom Kimball MD  "  diphenoxylate-atropine (LOMOTIL) 2.5-0.025 MG per tablet Take 1 tablet by mouth 4 (Four) Times a Day As Needed for Diarrhea.    Momo Kimball MD   lisdexamfetamine (Vyvanse) 70 MG capsule Take 1 capsule by mouth Daily As Needed    Momo Kimball MD   tiZANidine (ZANAFLEX) 4 MG tablet Take 1-2 tablets by mouth Every 8 (Eight) Hours As Needed. 10/21/20   Momo Kimball MD   doxycycline (MONODOX) 100 MG capsule Take 1 capsule by mouth 2 (Two) Times a Day for 10 days. 7/12/25 7/16/25  Mi Lowe APRN   ezetimibe (ZETIA) 10 MG tablet Take 1 tablet by mouth Daily. 1/30/25 7/16/25  Ajit Lin MD   Semaglutide-Weight Management 0.25 MG/0.5ML solution auto-injector Inject 0.5 mL into the appropriate muscle as directed by prescriber 1 (One) Time Per Week. 12/21/24 7/16/25  Momo Kimball MD   spironolactone (ALDACTONE) 100 MG tablet Take 1 tablet by mouth 2 (Two) Times a Day.  Patient taking differently: Take 1 tablet by mouth Daily.  7/16/25  Momo Kimball MD               Medical Decision Making      /95 (BP Location: Left arm, Patient Position: Lying)   Pulse 68   Temp 97.4 °F (36.3 °C) (Oral)   Resp 18   Ht 165.1 cm (65\")   Wt 117 kg (258 lb)   LMP 07/09/2025 (Approximate)   SpO2 95%   BMI 42.93 kg/m²      Chart review: 7/12/2025 blood culture 1 positive for gram-negative and wound culture positive for Klebsiella and E. coli.    Radiology interpretation:  X-rays reviewed and interpreted by stephanie: No radiopaque retained body  Further interpretation by radiologist as above  Lab interpretation:  Labs all viewed by me and significant for, cultures pending glucose 107 white count 6.1            Appropriate PPE worn during exam.  Discussed care with: Micha RASCON       Patient had IV established labs obtained.  Discussed with Kerrie pharmacist and repeat blood cultures were obtained and she was started on cefepime.  X-ray does not show any osteomyelitis or " retained foreign objects.  Will admit to observation for prelim blood cultures and IV antibiotics.        i discussed findings with patient who voices understanding of observation placement    Amount and/or Complexity of Data Reviewed  Labs: ordered.  Radiology: ordered.    Risk  OTC drugs.  Prescription drug management.  Decision regarding hospitalization.        Final diagnoses:   Cellulitis of left lower extremity       ED Disposition  ED Disposition       ED Disposition   Decision to Admit    Condition   --    Comment   --               No follow-up provider specified.       Medication List      No changes were made to your prescriptions during this visit.            Audra Luna, APRN  07/16/25 1825

## 2025-07-16 NOTE — LETTER
July 18, 2025     Patient: Princess Gomez   YOB: 1980   Date of Visit: 7/16/2025       To Whom It May Concern:    It is my medical opinion that Princess Gomez may return to work on 7/21/2025.           Sincerely,        KINJAL Bustamante/HEMAL Spain RN

## 2025-07-17 LAB
ANION GAP SERPL CALCULATED.3IONS-SCNC: 9 MMOL/L (ref 5–15)
BACTERIA SPEC AEROBE CULT: NORMAL
BASOPHILS # BLD AUTO: 0.07 10*3/MM3 (ref 0–0.2)
BASOPHILS NFR BLD AUTO: 1.1 % (ref 0–1.5)
BUN SERPL-MCNC: 15.2 MG/DL (ref 6–20)
BUN/CREAT SERPL: 12.3 (ref 7–25)
CALCIUM SPEC-SCNC: 9.1 MG/DL (ref 8.6–10.5)
CHLORIDE SERPL-SCNC: 105 MMOL/L (ref 98–107)
CO2 SERPL-SCNC: 27 MMOL/L (ref 22–29)
CREAT SERPL-MCNC: 1.24 MG/DL (ref 0.57–1)
DEPRECATED RDW RBC AUTO: 46 FL (ref 37–54)
EGFRCR SERPLBLD CKD-EPI 2021: 54.1 ML/MIN/1.73
EOSINOPHIL # BLD AUTO: 0.26 10*3/MM3 (ref 0–0.4)
EOSINOPHIL NFR BLD AUTO: 3.9 % (ref 0.3–6.2)
ERYTHROCYTE [DISTWIDTH] IN BLOOD BY AUTOMATED COUNT: 13.2 % (ref 12.3–15.4)
GLUCOSE SERPL-MCNC: 76 MG/DL (ref 65–99)
HCT VFR BLD AUTO: 38.6 % (ref 34–46.6)
HGB BLD-MCNC: 11.7 G/DL (ref 12–15.9)
IMM GRANULOCYTES # BLD AUTO: 0.01 10*3/MM3 (ref 0–0.05)
IMM GRANULOCYTES NFR BLD AUTO: 0.2 % (ref 0–0.5)
LYMPHOCYTES # BLD AUTO: 2.01 10*3/MM3 (ref 0.7–3.1)
LYMPHOCYTES NFR BLD AUTO: 30.5 % (ref 19.6–45.3)
MCH RBC QN AUTO: 29 PG (ref 26.6–33)
MCHC RBC AUTO-ENTMCNC: 30.3 G/DL (ref 31.5–35.7)
MCV RBC AUTO: 95.5 FL (ref 79–97)
MONOCYTES # BLD AUTO: 0.61 10*3/MM3 (ref 0.1–0.9)
MONOCYTES NFR BLD AUTO: 9.2 % (ref 5–12)
NEUTROPHILS NFR BLD AUTO: 3.64 10*3/MM3 (ref 1.7–7)
NEUTROPHILS NFR BLD AUTO: 55.1 % (ref 42.7–76)
NRBC BLD AUTO-RTO: 0 /100 WBC (ref 0–0.2)
PLATELET # BLD AUTO: 310 10*3/MM3 (ref 140–450)
PMV BLD AUTO: 10.2 FL (ref 6–12)
POTASSIUM SERPL-SCNC: 4.5 MMOL/L (ref 3.5–5.2)
QT INTERVAL: 410 MS
QTC INTERVAL: 419 MS
RBC # BLD AUTO: 4.04 10*6/MM3 (ref 3.77–5.28)
SODIUM SERPL-SCNC: 141 MMOL/L (ref 136–145)
WBC NRBC COR # BLD AUTO: 6.6 10*3/MM3 (ref 3.4–10.8)

## 2025-07-17 PROCEDURE — 93010 ELECTROCARDIOGRAM REPORT: CPT | Performed by: INTERNAL MEDICINE

## 2025-07-17 PROCEDURE — G0378 HOSPITAL OBSERVATION PER HR: HCPCS

## 2025-07-17 PROCEDURE — 80048 BASIC METABOLIC PNL TOTAL CA: CPT | Performed by: PHYSICIAN ASSISTANT

## 2025-07-17 PROCEDURE — 93005 ELECTROCARDIOGRAM TRACING: CPT | Performed by: EMERGENCY MEDICINE

## 2025-07-17 PROCEDURE — 85025 COMPLETE CBC W/AUTO DIFF WBC: CPT | Performed by: PHYSICIAN ASSISTANT

## 2025-07-17 RX ORDER — LEVOFLOXACIN 750 MG/1
750 TABLET, FILM COATED ORAL EVERY 24 HOURS
Status: DISCONTINUED | OUTPATIENT
Start: 2025-07-17 | End: 2025-07-18 | Stop reason: HOSPADM

## 2025-07-17 RX ORDER — BUTALBITAL, ACETAMINOPHEN AND CAFFEINE 50; 325; 40 MG/1; MG/1; MG/1
1 TABLET ORAL EVERY 4 HOURS PRN
Status: DISCONTINUED | OUTPATIENT
Start: 2025-07-17 | End: 2025-07-18 | Stop reason: HOSPADM

## 2025-07-17 RX ADMIN — QUETIAPINE FUMARATE 400 MG: 100 TABLET ORAL at 20:59

## 2025-07-17 RX ADMIN — OXYCODONE 10 MG: 5 TABLET ORAL at 23:58

## 2025-07-17 RX ADMIN — BUTALBITAL, ACETAMINOPHEN, AND CAFFEINE 1 TABLET: 325; 50; 40 TABLET ORAL at 11:59

## 2025-07-17 RX ADMIN — TIZANIDINE 4 MG: 4 TABLET ORAL at 17:23

## 2025-07-17 RX ADMIN — OXYCODONE 10 MG: 5 TABLET ORAL at 00:40

## 2025-07-17 RX ADMIN — BUPROPION HYDROCHLORIDE 150 MG: 150 TABLET, FILM COATED, EXTENDED RELEASE ORAL at 08:44

## 2025-07-17 RX ADMIN — PROPRANOLOL HYDROCHLORIDE 20 MG: 20 TABLET ORAL at 20:59

## 2025-07-17 RX ADMIN — LOSARTAN POTASSIUM 25 MG: 25 TABLET, FILM COATED ORAL at 08:44

## 2025-07-17 RX ADMIN — OXYCODONE 10 MG: 5 TABLET ORAL at 13:08

## 2025-07-17 RX ADMIN — BUTALBITAL, ACETAMINOPHEN, AND CAFFEINE 1 TABLET: 325; 50; 40 TABLET ORAL at 16:54

## 2025-07-17 RX ADMIN — FLUOXETINE 40 MG: 20 CAPSULE ORAL at 20:58

## 2025-07-17 RX ADMIN — OXYCODONE 10 MG: 5 TABLET ORAL at 19:08

## 2025-07-17 RX ADMIN — OXYCODONE 10 MG: 5 TABLET ORAL at 06:10

## 2025-07-17 RX ADMIN — BUPROPION HYDROCHLORIDE 150 MG: 150 TABLET, FILM COATED, EXTENDED RELEASE ORAL at 20:58

## 2025-07-17 RX ADMIN — PANTOPRAZOLE SODIUM 40 MG: 40 TABLET, DELAYED RELEASE ORAL at 20:58

## 2025-07-17 RX ADMIN — Medication 10 ML: at 08:45

## 2025-07-17 RX ADMIN — PANTOPRAZOLE SODIUM 40 MG: 40 TABLET, DELAYED RELEASE ORAL at 08:44

## 2025-07-17 RX ADMIN — ATENOLOL 100 MG: 50 TABLET ORAL at 20:58

## 2025-07-17 RX ADMIN — LEVOFLOXACIN 750 MG: 750 TABLET, FILM COATED ORAL at 13:09

## 2025-07-17 RX ADMIN — PROPRANOLOL HYDROCHLORIDE 20 MG: 20 TABLET ORAL at 08:44

## 2025-07-17 RX ADMIN — ATENOLOL 100 MG: 50 TABLET ORAL at 08:44

## 2025-07-17 NOTE — H&P
[facility] H&P    Patient Name: Princess Gomez  : 1980  MRN: [id number]  Primary Care Physician: Mis Linton APRN  Date of admission: 2025     Patient Care Team:  Mis Linton APRN as PCP - General (Family Medicine)          Subjective   History Present Illness     Chief Complaint:   Chief Complaint   Patient presents with    Wound Infection     Patient states last Wednesday she was cutting grass and a rock hit her in the LLE. Wound is painful, swollen, and red. Patient rates pain 7/10     Abnormal blood culture results    History of Present Illness    ED  45-year-old female presents with complaints of left lower extremity pain after she was hit with a rock while mowing. She was seen at a freestanding ER and started on doxycycline states she was switched to Bactrim yesterday after results of her blood culture and wound culture. Continues to complain of pain and swelling with subjective fever.     Observation 25  Pt concurs with er hpi. Pt having no pain in leg. Wound healing well. Labs unremarkable. Awaiting blood cx finalization with bacterial ID. Pt lost iv so levaquin for 7 days ordered. Pt was ready to go home but agreed to stay until tomorrow for results.      Review of Systems   Constitutional:        Pt has no complaints   Skin:         Small are of redness on left shin             Personal History     Past Medical History:   Past Medical History:   Diagnosis Date    Anxiety     Binge eating disorder     Chronic back pain     Depression     GERD (gastroesophageal reflux disease)     Hypertension     Kidney stones        Surgical History:      Past Surgical History:   Procedure Laterality Date    APPENDECTOMY      EXTRACORPOREAL SHOCKWAVE LITHOTRIPSY (ESWL), STENT INSERTION/REMOVAL      LAPAROSCOPIC CHOLECYSTECTOMY             Family History: family history includes Hypertension in her father. Otherwise pertinent FHx was reviewed and unremarkable.     Social  History:  reports that she has never smoked. She has never used smokeless tobacco. She reports that she does not drink alcohol and does not use drugs.      Medications:  Prior to Admission medications    Medication Sig Start Date End Date Taking? Authorizing Provider   atenolol (TENORMIN) 100 MG tablet Take 1 tablet by mouth 2 (Two) Times a Day. 10/20/20  Yes Momo Kimball MD   buPROPion SR (WELLBUTRIN SR) 150 MG 12 hr tablet Take 1 tablet by mouth 2 (Two) Times a Day.   Yes Momo Kimball MD   esomeprazole (nexIUM) 40 MG capsule Take 1 capsule by mouth 2 (Two) Times a Day.   Yes Momo Kimball MD   FLUoxetine (PROzac) 40 MG capsule Take 1 capsule by mouth Every Evening.   Yes Momo Kimball MD   lisdexamfetamine (Vyvanse) 70 MG capsule Take 1 capsule by mouth Daily As Needed   Yes Momo Kimball MD   losartan (Cozaar) 25 MG tablet Take 1 tablet by mouth Daily. 10/31/24  Yes Ajit Lin MD   montelukast (SINGULAIR) 10 MG tablet Take 1 tablet by mouth Every Night.   Yes Momo Kimball MD   oxyCODONE-acetaminophen (PERCOCET)  MG per tablet Take 1 tablet by mouth Every 6 (Six) Hours As Needed for Moderate Pain.   Yes Momo Kimball MD   propranolol (INDERAL) 20 MG tablet Take 1 tablet by mouth 2 (Two) Times a Day.   Yes Momo Kimball MD   QUEtiapine (SEROquel) 100 MG tablet Take 1 tablet by mouth Every Night. In addition to 200mg tablet for a total dose of 300mg nightly   Yes Momo Kimball MD   QUEtiapine (SEROquel) 200 MG tablet 1 tablet Every Night. In addition to 100mg tablet for a total dose of 300mg nightly 10/15/20  Yes Momo Kimball MD   sulfamethoxazole-trimethoprim (BACTRIM DS,SEPTRA DS) 800-160 MG per tablet Take 1 tablet by mouth 2 (Two) Times a Day for 7 days. 7/15/25 7/22/25 Yes Emmy Luna APRN   tiZANidine (ZANAFLEX) 4 MG tablet Take 1-2 tablets by mouth Every 8 (Eight) Hours As Needed. 10/21/20  Yes  Provider, MD Momo   triamcinolone (KENALOG) 0.1 % cream Apply 1 Application topically to the appropriate area as directed 2 (Two) Times a Day.   Yes ProviderMomo MD   diphenoxylate-atropine (LOMOTIL) 2.5-0.025 MG per tablet Take 1 tablet by mouth 4 (Four) Times a Day As Needed for Diarrhea.    Provider, MD Momo   amoxicillin-clavulanate (AUGMENTIN) 875-125 MG per tablet Take 1 tablet by mouth 2 (Two) Times a Day for 10 days. 7/17/25 7/17/25  Sommer Gifford PA-C       Allergies:    Allergies   Allergen Reactions    Iodine Other (See Comments)     Other      Statins Myalgia       Objective   Objective     Vital Signs  Temp:  [97.9 °F (36.6 °C)-98.2 °F (36.8 °C)] 98.1 °F (36.7 °C)  Heart Rate:  [66-76] 66  Resp:  [13-18] 16  BP: (111-160)/(74-95) 145/88  SpO2:  [93 %-99 %] 99 %  on   ;   Device (Oxygen Therapy): room air  Body mass index is 43.29 kg/m².    Physical Exam  Constitutional:       Appearance: Normal appearance. She is obese.   Cardiovascular:      Rate and Rhythm: Normal rate and regular rhythm.   Pulmonary:      Effort: Pulmonary effort is normal.      Breath sounds: Normal breath sounds.   Skin:     General: Skin is warm and dry.      Comments: Small area 2cm in size with erythema and slight warmth on shin, no drainage   Neurological:      General: No focal deficit present.      Mental Status: She is alert and oriented to person, place, and time. Mental status is at baseline.   Psychiatric:         Mood and Affect: Mood normal.         Behavior: Behavior normal.       Results Review:  I have personally reviewed most recent lab results, microbiology results, and radiology images and interpretations and agree with findings, most notably: cbc, bmp, blood cx, wound cx, xray tib/fib, ekg.    Results from last 7 days   Lab Units 07/17/25  0437   WBC 10*3/mm3 6.60   HEMOGLOBIN g/dL 11.7*   HEMATOCRIT % 38.6   PLATELETS 10*3/mm3 310     Results from last 7 days   Lab Units 07/17/25  3082  07/16/25  1232 07/12/25 2104   SODIUM mmol/L 141   < > 137   POTASSIUM mmol/L 4.5   < > 4.0   CHLORIDE mmol/L 105   < > 104   CO2 mmol/L 27.0   < > 23.9   BUN mg/dL 15.2   < > 13.7   CREATININE mg/dL 1.24*   < > 1.41*   GLUCOSE mg/dL 76   < > 108*   CALCIUM mg/dL 9.1   < > 8.9   ALK PHOS U/L  --   --  67   ALT (SGPT) U/L  --   --  21   AST (SGOT) U/L  --   --  22    < > = values in this interval not displayed.     Estimated Creatinine Clearance: 72.1 mL/min (A) (by C-G formula based on SCr of 1.24 mg/dL (H)).  Brief Urine Lab Results       None            Microbiology Results (last 10 days)       Procedure Component Value - Date/Time    Blood Culture - Blood, Arm, Left [[id number]]  (Abnormal) Collected: 07/12/25 2116    Lab Status: Preliminary result Specimen: Blood from Arm, Left Updated: 07/17/25 0627     Blood Culture Gram Negative Bacilli     Isolated from Aerobic Bottle     Gram Stain Aerobic Bottle Gram negative bacilli    Blood Culture ID, PCR - Blood, Arm, Left [[id number]] Collected: 07/12/25 2116    Lab Status: Final result Specimen: Blood from Arm, Left Updated: 07/15/25 1924     BCID, PCR Negative by BCID PCR. Culture to Follow.     BOTTLE TYPE Aerobic Bottle    Narrative:            Wound Culture - Wound, Leg, Right [[id number]]  (Abnormal)  (Susceptibility) Collected: 07/12/25 2106    Lab Status: Final result Specimen: Wound from Leg, Right Updated: 07/15/25 0843     Wound Culture Scant growth (1+) Escherichia coli      Scant growth (1+) Klebsiella aerogenes     Comment:   This organism may develop resistance during prolonged therapy with 3rd generation cephalosporins (e.g. ceftriaxone) as a result of de-repression of AmpC B-lactamase.  Ceftriaxone may be a reasonable treatment option for uncomplicated cystitis or other lower severity infections when susceptibility is demonstrated.         Scant growth (1+) Normal Skin Nimco     Gram Stain Few (2+) WBCs per low power field      No organisms seen     Susceptibility        Escherichia coli      LAVELLE      Amoxicillin + Clavulanate Susceptible      Ampicillin Susceptible      Ampicillin + Sulbactam Susceptible      Cefazolin (Non Urine) Susceptible      Cefepime Susceptible      Ceftazidime Susceptible      Ceftriaxone Susceptible      Cefuroxime axetil Susceptible      Gentamicin Susceptible      Levofloxacin Susceptible      Piperacillin + Tazobactam Susceptible      Tetracycline Susceptible      Trimethoprim + Sulfamethoxazole Susceptible                       Susceptibility        Klebsiella aerogenes      LAVELLE      Cefepime Susceptible      Ceftazidime Susceptible      Ceftriaxone Susceptible      Gentamicin Susceptible      Levofloxacin Susceptible      Trimethoprim + Sulfamethoxazole Susceptible                       Susceptibility Comments       Escherichia coli    With the exception of urinary-sourced infections, aminoglycosides should not be used as monotherapy.      Klebsiella aerogenes    With the exception of urinary-sourced infections, aminoglycosides should not be used as monotherapy.               Blood Culture - Blood, Arm, Right [[id number]]  (Normal) Collected: 07/12/25 2105    Lab Status: Preliminary result Specimen: Blood from Arm, Right Updated: 07/16/25 2116     Blood Culture No growth at 4 days            ECG/EMG Results (most recent)       Procedure Component Value Units Date/Time    Telemetry Scan [[id number]] Resulted: 07/16/25 1512     Updated: 07/16/25 1519    Telemetry Scan [[id number]] Resulted: 07/16/25 2051     Updated: 07/16/25 2119    Telemetry Scan [[id number]] Resulted: 07/16/25 2336     Updated: 07/17/25 0035    Telemetry Scan [[id number]] Resulted: 07/17/25 0408     Updated: 07/17/25 0438    Telemetry Scan [[id number]] Resulted: 07/17/25 0720     Updated: 07/17/25 0806    Telemetry Scan [[id number]] Resulted: 07/17/25 1112     Updated: 07/17/25 1205    ECG 12 Lead QT Measurement [[id number]] Collected: 07/17/25 1211     Updated:  07/17/25 1213     QT Interval 410 ms      QTC Interval 419 ms     Narrative:      HEART RATE=63  bpm  RR Rabrifbh=244  ms  TX Jezxnofd=045  ms  P Horizontal Axis=-16  deg  P Front Axis=39  deg  QRSD Interval=94  ms  QT Cinefita=335  ms  UGyI=361  ms  QRS Axis=8  deg  T Wave Axis=3  deg  - NORMAL ECG -  Sinus rhythm  Date and Time of Study:2025-07-17 12:11:32                No results found for this or any previous visit.      XR Tibia Fibula 2 View Left  Result Date: 7/16/2025  Impression: No acute finding of the left tibia and fibula. Electronically Signed: Kelly Mariscal MD  7/16/2025 12:07 PM EDT  Workstation ID: [id number]    XR Tibia Fibula 2 View Right  Result Date: 7/12/2025  Impression: No acute abnormality. Electronically Signed: Arslan Anderson MD  7/12/2025 10:20 PM EDT  Workstation ID: [id number]        Estimated Creatinine Clearance: 72.1 mL/min (A) (by C-G formula based on SCr of 1.24 mg/dL (H)).    Assessment & Plan   Assessment/Plan       Active Hospital Problems    Diagnosis  POA    **Cellulitis [L03.90]  Yes      Resolved Hospital Problems   No resolved problems to display.       Cellulitis  - cbc and bmp unremarkable  - prior wound cx ecoli, kleb  - 1 blood cx from outside hospital was + and growing gram - bacteria  - 2 repeat cx were done here 7/16 and have no growh at 24 hrs  - levaquin started due to pt losing iv and inability to obtain new one- EKG obtained qt 410  - awaiting finalized results of blood cx tomorrow  - pt has no complaints    Hypertension  -moderately Controlled   BP Readings from Last 1 Encounters:   07/17/25 145/88     - Continue losartan, atenolol  - Monitor while admitted     Depression  - seroquel        VTE Prophylaxis - Active VTE Prophylaxis  Mechanical:        Start        07/16/25 1417  Maintain Sequential Compression Device  Continuous                          Select Pharmacologic VTE Prophylaxis if Desired & Appropriate      CODE STATUS:    Code Status and Medical  Interventions: CPR (Attempt to Resuscitate); Full Support   Ordered at: 07/16/25 1417     Code Status (Patient has no pulse and is not breathing):    CPR (Attempt to Resuscitate)     Medical Interventions (Patient has pulse or is breathing):    Full Support       This patient has been examined wearing personal protective equipment.     I discussed the patient's findings and my recommendations with patient and nursing staff.      Signature:Electronically signed by Sommer Gifford PA-C, 07/17/25, 5:07 PM EDT.

## 2025-07-17 NOTE — PLAN OF CARE
Problem: Adult Inpatient Plan of Care  Goal: Plan of Care Review  Outcome: Progressing  Flowsheets (Taken 7/17/2025 0604)  Progress: improving  Plan of Care Reviewed With: patient  Goal: Patient-Specific Goal (Individualized)  Outcome: Progressing  Goal: Absence of Hospital-Acquired Illness or Injury  Outcome: Progressing  Intervention: Identify and Manage Fall Risk  Recent Flowsheet Documentation  Taken 7/17/2025 0603 by Janine Castillo RN  Safety Promotion/Fall Prevention: safety round/check completed  Taken 7/17/2025 0411 by Janine Castillo RN  Safety Promotion/Fall Prevention: safety round/check completed  Taken 7/17/2025 0356 by Janine Castillo RN  Safety Promotion/Fall Prevention: safety round/check completed  Taken 7/17/2025 0200 by Janine Castillo RN  Safety Promotion/Fall Prevention: safety round/check completed  Taken 7/17/2025 0007 by Janine Castillo RN  Safety Promotion/Fall Prevention: safety round/check completed  Taken 7/16/2025 2200 by Janine Castillo RN  Safety Promotion/Fall Prevention: safety round/check completed  Taken 7/16/2025 2002 by Janine Castillo RN  Safety Promotion/Fall Prevention: safety round/check completed  Goal: Optimal Comfort and Wellbeing  Outcome: Progressing  Intervention: Monitor Pain and Promote Comfort  Recent Flowsheet Documentation  Taken 7/16/2025 1932 by Janine Castillo RN  Pain Management Interventions: pain medication given  Intervention: Provide Person-Centered Care  Recent Flowsheet Documentation  Taken 7/16/2025 2002 by Janine Castillo RN  Trust Relationship/Rapport: care explained  Goal: Readiness for Transition of Care  Outcome: Progressing     Problem: Skin or Soft Tissue Infection  Goal: Absence of Infection Signs and Symptoms  Outcome: Progressing     Problem: Pain Acute  Goal: Optimal Pain Control and Function  Outcome: Progressing  Intervention: Develop Pain Management Plan  Recent Flowsheet Documentation  Taken 7/16/2025 1932 by  Janine Castillo, RN  Pain Management Interventions: pain medication given     Problem: Nausea and Vomiting  Goal: Nausea and Vomiting Relief  Outcome: Progressing     Problem: Comorbidity Management  Goal: Maintenance of Behavioral Health Symptom Control  Outcome: Progressing  Goal: Blood Pressure in Desired Range  Outcome: Progressing   Goal Outcome Evaluation:  Plan of Care Reviewed With: patient        Progress: improving

## 2025-07-18 VITALS
OXYGEN SATURATION: 94 % | SYSTOLIC BLOOD PRESSURE: 133 MMHG | WEIGHT: 260.14 LBS | HEART RATE: 67 BPM | BODY MASS INDEX: 43.34 KG/M2 | HEIGHT: 65 IN | RESPIRATION RATE: 16 BRPM | DIASTOLIC BLOOD PRESSURE: 84 MMHG | TEMPERATURE: 98.2 F

## 2025-07-18 LAB
ANION GAP SERPL CALCULATED.3IONS-SCNC: 8.3 MMOL/L (ref 5–15)
BASOPHILS # BLD AUTO: 0.06 10*3/MM3 (ref 0–0.2)
BASOPHILS NFR BLD AUTO: 1.1 % (ref 0–1.5)
BUN SERPL-MCNC: 15.5 MG/DL (ref 6–20)
BUN/CREAT SERPL: 14.8 (ref 7–25)
CALCIUM SPEC-SCNC: 9.2 MG/DL (ref 8.6–10.5)
CHLORIDE SERPL-SCNC: 103 MMOL/L (ref 98–107)
CO2 SERPL-SCNC: 26.7 MMOL/L (ref 22–29)
CREAT SERPL-MCNC: 1.05 MG/DL (ref 0.57–1)
DEPRECATED RDW RBC AUTO: 44.9 FL (ref 37–54)
EGFRCR SERPLBLD CKD-EPI 2021: 66.1 ML/MIN/1.73
EOSINOPHIL # BLD AUTO: 0.3 10*3/MM3 (ref 0–0.4)
EOSINOPHIL NFR BLD AUTO: 5.3 % (ref 0.3–6.2)
ERYTHROCYTE [DISTWIDTH] IN BLOOD BY AUTOMATED COUNT: 13.1 % (ref 12.3–15.4)
GLUCOSE SERPL-MCNC: 94 MG/DL (ref 65–99)
HCT VFR BLD AUTO: 35.9 % (ref 34–46.6)
HGB BLD-MCNC: 11 G/DL (ref 12–15.9)
IMM GRANULOCYTES # BLD AUTO: 0.01 10*3/MM3 (ref 0–0.05)
IMM GRANULOCYTES NFR BLD AUTO: 0.2 % (ref 0–0.5)
LYMPHOCYTES # BLD AUTO: 1.92 10*3/MM3 (ref 0.7–3.1)
LYMPHOCYTES NFR BLD AUTO: 34 % (ref 19.6–45.3)
MCH RBC QN AUTO: 28.6 PG (ref 26.6–33)
MCHC RBC AUTO-ENTMCNC: 30.6 G/DL (ref 31.5–35.7)
MCV RBC AUTO: 93.5 FL (ref 79–97)
MONOCYTES # BLD AUTO: 0.49 10*3/MM3 (ref 0.1–0.9)
MONOCYTES NFR BLD AUTO: 8.7 % (ref 5–12)
NEUTROPHILS NFR BLD AUTO: 2.87 10*3/MM3 (ref 1.7–7)
NEUTROPHILS NFR BLD AUTO: 50.7 % (ref 42.7–76)
NRBC BLD AUTO-RTO: 0 /100 WBC (ref 0–0.2)
PLATELET # BLD AUTO: 294 10*3/MM3 (ref 140–450)
PMV BLD AUTO: 9.9 FL (ref 6–12)
POTASSIUM SERPL-SCNC: 4.1 MMOL/L (ref 3.5–5.2)
RBC # BLD AUTO: 3.84 10*6/MM3 (ref 3.77–5.28)
SODIUM SERPL-SCNC: 138 MMOL/L (ref 136–145)
WBC NRBC COR # BLD AUTO: 5.65 10*3/MM3 (ref 3.4–10.8)

## 2025-07-18 PROCEDURE — 80048 BASIC METABOLIC PNL TOTAL CA: CPT | Performed by: PHYSICIAN ASSISTANT

## 2025-07-18 PROCEDURE — G0378 HOSPITAL OBSERVATION PER HR: HCPCS

## 2025-07-18 PROCEDURE — 85025 COMPLETE CBC W/AUTO DIFF WBC: CPT | Performed by: PHYSICIAN ASSISTANT

## 2025-07-18 RX ORDER — LEVOFLOXACIN 750 MG/1
750 TABLET, FILM COATED ORAL EVERY 24 HOURS
Qty: 6 TABLET | Refills: 0 | Status: SHIPPED | OUTPATIENT
Start: 2025-07-18 | End: 2025-07-24

## 2025-07-18 RX ADMIN — TIZANIDINE 4 MG: 4 TABLET ORAL at 00:27

## 2025-07-18 RX ADMIN — BUPROPION HYDROCHLORIDE 150 MG: 150 TABLET, FILM COATED, EXTENDED RELEASE ORAL at 08:15

## 2025-07-18 RX ADMIN — PROPRANOLOL HYDROCHLORIDE 20 MG: 20 TABLET ORAL at 08:15

## 2025-07-18 RX ADMIN — LOSARTAN POTASSIUM 25 MG: 25 TABLET, FILM COATED ORAL at 08:15

## 2025-07-18 RX ADMIN — PANTOPRAZOLE SODIUM 40 MG: 40 TABLET, DELAYED RELEASE ORAL at 08:15

## 2025-07-18 RX ADMIN — OXYCODONE 10 MG: 5 TABLET ORAL at 09:40

## 2025-07-18 RX ADMIN — ATENOLOL 100 MG: 50 TABLET ORAL at 08:15

## 2025-07-18 RX ADMIN — OXYCODONE 10 MG: 5 TABLET ORAL at 05:15

## 2025-07-18 NOTE — CASE MANAGEMENT/SOCIAL WORK
Case Management Discharge Note      Final Note: Routine home                 Transportation Services  Transportation: Private Transportation  Private: Car    Final Discharge Disposition Code: 01 - home or self-care

## 2025-07-18 NOTE — DISCHARGE SUMMARY
[facility]    Mis LintonKINJAL    CHIEF COMPLAINT:       Wound infection     HISTORY OF PRESENT ILLNESS:    HPI    ED  45-year-old female presents with complaints of left lower extremity pain after she was hit with a rock while mowing. She was seen at a freestanding ER and started on doxycycline states she was switched to Bactrim yesterday after results of her blood culture and wound culture. Continues to complain of pain and swelling with subjective fever.      Observation 7/17/25  Pt concurs with er hpi. Pt having no pain in leg. Wound healing well. Labs unremarkable. Awaiting blood cx finalization with bacterial ID. Pt lost iv so levaquin for 7 days ordered. Pt was ready to go home but agreed to stay until tomorrow for results.      Observation 7/18/25  Patient denies any complaints and is eager for discharge. She has noted improvement of symptoms and will be discharged on levaquin.     Past Medical History:   Diagnosis Date    Anxiety     Binge eating disorder     Chronic back pain     Depression     GERD (gastroesophageal reflux disease)     Hypertension     Kidney stones      Past Surgical History:   Procedure Laterality Date    APPENDECTOMY      EXTRACORPOREAL SHOCKWAVE LITHOTRIPSY (ESWL), STENT INSERTION/REMOVAL      LAPAROSCOPIC CHOLECYSTECTOMY       Family History   Problem Relation Age of Onset    Hypertension Father      Social History     Tobacco Use    Smoking status: Never    Smokeless tobacco: Never   Vaping Use    Vaping status: Never Used   Substance Use Topics    Alcohol use: Never    Drug use: Never     Medications Prior to Admission   Medication Sig Dispense Refill Last Dose/Taking    atenolol (TENORMIN) 100 MG tablet Take 1 tablet by mouth 2 (Two) Times a Day.   7/16/2025    buPROPion SR (WELLBUTRIN SR) 150 MG 12 hr tablet Take 1 tablet by mouth 2 (Two) Times a Day.   7/16/2025    esomeprazole (nexIUM) 40 MG capsule Take 1 capsule by mouth 2 (Two) Times a Day.    7/16/2025    FLUoxetine (PROzac) 40 MG capsule Take 1 capsule by mouth Every Evening.   7/15/2025    lisdexamfetamine (Vyvanse) 70 MG capsule Take 1 capsule by mouth Daily As Needed   Past Week    losartan (Cozaar) 25 MG tablet Take 1 tablet by mouth Daily. 90 tablet 3 7/16/2025    montelukast (SINGULAIR) 10 MG tablet Take 1 tablet by mouth Every Night.   7/15/2025    oxyCODONE-acetaminophen (PERCOCET)  MG per tablet Take 1 tablet by mouth Every 6 (Six) Hours As Needed for Moderate Pain.   7/15/2025    propranolol (INDERAL) 20 MG tablet Take 1 tablet by mouth 2 (Two) Times a Day.   7/16/2025    QUEtiapine (SEROquel) 100 MG tablet Take 1 tablet by mouth Every Night. In addition to 200mg tablet for a total dose of 300mg nightly   7/15/2025    QUEtiapine (SEROquel) 200 MG tablet 1 tablet Every Night. In addition to 100mg tablet for a total dose of 300mg nightly   7/15/2025    sulfamethoxazole-trimethoprim (BACTRIM DS,SEPTRA DS) 800-160 MG per tablet Take 1 tablet by mouth 2 (Two) Times a Day for 7 days. 14 tablet 0 7/16/2025 Morning    tiZANidine (ZANAFLEX) 4 MG tablet Take 1-2 tablets by mouth Every 8 (Eight) Hours As Needed.   7/15/2025    triamcinolone (KENALOG) 0.1 % cream Apply 1 Application topically to the appropriate area as directed 2 (Two) Times a Day.   Past Week    diphenoxylate-atropine (LOMOTIL) 2.5-0.025 MG per tablet Take 1 tablet by mouth 4 (Four) Times a Day As Needed for Diarrhea.        Allergies:  Iodine and Statins    Immunization History   Administered Date(s) Administered    Tdap 07/12/2025           REVIEW OF SYSTEMS:    ROS  Review of Systems   Constitutional:        Pt has no complaints   Skin:         Small are of redness on left shin        Vital Signs  Temp:  [97.2 °F (36.2 °C)-98.2 °F (36.8 °C)] 98.2 °F (36.8 °C)  Heart Rate:  [63-72] 67  Resp:  [16-20] 16  BP: (108-145)/(57-88) 133/84          Physical Exam:  Physical Exam    Physical Exam  Constitutional:       Appearance: Normal  appearance. She is obese.   Cardiovascular:      Rate and Rhythm: Normal rate and regular rhythm.   Pulmonary:      Effort: Pulmonary effort is normal.      Breath sounds: Normal breath sounds.   Skin:     General: Skin is warm and dry.      Comments: Small area 2cm in size with erythema and slight warmth on shin, no drainage. Reports that wound/erythema looks better.   Neurological:      General: No focal deficit present.      Mental Status: She is alert and oriented to person, place, and time. Mental status is at baseline.   Psychiatric:         Mood and Affect: Mood normal.         Behavior: Behavior normal.     Emotional Behavior:    Normal    Debilities:   None  Results Review:    I reviewed the patient's new clinical results.  Lab Results (most recent)       Procedure Component Value Units Date/Time    Basic Metabolic Panel [[id number]]  (Abnormal) Collected: 07/18/25 0544    Specimen: Blood Updated: 07/18/25 0617     Glucose 94 mg/dL      BUN 15.5 mg/dL      Creatinine 1.05 mg/dL      Sodium 138 mmol/L      Potassium 4.1 mmol/L      Chloride 103 mmol/L      CO2 26.7 mmol/L      Calcium 9.2 mg/dL      BUN/Creatinine Ratio 14.8     Anion Gap 8.3 mmol/L      eGFR 66.1 mL/min/1.73     Narrative:      GFR Categories in Chronic Kidney Disease (CKD)              GFR Category          GFR (mL/min/1.73)    Interpretation  G1                    90 or greater        Normal or high (1)  G2                    60-89                Mild decrease (1)  G3a                   45-59                Mild to moderate decrease  G3b                   30-44                Moderate to severe decrease  G4                    15-29                Severe decrease  G5                    14 or less           Kidney failure    (1)In the absence of evidence of kidney disease, neither GFR category G1 or G2 fulfill the criteria for CKD.    eGFR calculation 2021 CKD-EPI creatinine equation, which does not include race as a factor    CBC &  Differential [[id number]]  (Abnormal) Collected: 07/18/25 0544    Specimen: Blood Updated: 07/18/25 0556    Narrative:      The following orders were created for panel order CBC & Differential.  Procedure                               Abnormality         Status                     ---------                               -----------         ------                     CBC Auto Differential[[id number]]        Abnormal            Final result                 Please view results for these tests on the individual orders.    CBC Auto Differential [[id number]]  (Abnormal) Collected: 07/18/25 0544    Specimen: Blood Updated: 07/18/25 0556     WBC 5.65 10*3/mm3      RBC 3.84 10*6/mm3      Hemoglobin 11.0 g/dL      Hematocrit 35.9 %      MCV 93.5 fL      MCH 28.6 pg      MCHC 30.6 g/dL      RDW 13.1 %      RDW-SD 44.9 fl      MPV 9.9 fL      Platelets 294 10*3/mm3      Neutrophil % 50.7 %      Lymphocyte % 34.0 %      Monocyte % 8.7 %      Eosinophil % 5.3 %      Basophil % 1.1 %      Immature Grans % 0.2 %      Neutrophils, Absolute 2.87 10*3/mm3      Lymphocytes, Absolute 1.92 10*3/mm3      Monocytes, Absolute 0.49 10*3/mm3      Eosinophils, Absolute 0.30 10*3/mm3      Basophils, Absolute 0.06 10*3/mm3      Immature Grans, Absolute 0.01 10*3/mm3      nRBC 0.0 /100 WBC     Blood Culture - Blood, Arm, Left [[id number]]  (Normal) Collected: 07/16/25 1305    Specimen: Blood from Arm, Left Updated: 07/17/25 1315     Blood Culture No growth at 24 hours    Narrative:      Less than seven (7) mL's of blood was collected.  Insufficient quantity may yield false negative results.    Blood Culture - Blood, Arm, Right [[id number]]  (Normal) Collected: 07/16/25 1233    Specimen: Blood from Arm, Right Updated: 07/17/25 1245     Blood Culture No growth at 24 hours    Basic Metabolic Panel [[id number]]  (Abnormal) Collected: 07/17/25 0437    Specimen: Blood Updated: 07/17/25 0521     Glucose 76 mg/dL      BUN 15.2 mg/dL      Creatinine 1.24 mg/dL       Sodium 141 mmol/L      Potassium 4.5 mmol/L      Comment: Specimen hemolyzed.  Result may be falsely elevated.        Chloride 105 mmol/L      CO2 27.0 mmol/L      Calcium 9.1 mg/dL      BUN/Creatinine Ratio 12.3     Anion Gap 9.0 mmol/L      eGFR 54.1 mL/min/1.73     Narrative:      GFR Categories in Chronic Kidney Disease (CKD)              GFR Category          GFR (mL/min/1.73)    Interpretation  G1                    90 or greater        Normal or high (1)  G2                    60-89                Mild decrease (1)  G3a                   45-59                Mild to moderate decrease  G3b                   30-44                Moderate to severe decrease  G4                    15-29                Severe decrease  G5                    14 or less           Kidney failure    (1)In the absence of evidence of kidney disease, neither GFR category G1 or G2 fulfill the criteria for CKD.    eGFR calculation 2021 CKD-EPI creatinine equation, which does not include race as a factor    CBC & Differential [[id number]]  (Abnormal) Collected: 07/17/25 0437    Specimen: Blood Updated: 07/17/25 0447    Narrative:      The following orders were created for panel order CBC & Differential.  Procedure                               Abnormality         Status                     ---------                               -----------         ------                     CBC Auto Differential[[id number]]        Abnormal            Final result                 Please view results for these tests on the individual orders.    CBC Auto Differential [[id number]]  (Abnormal) Collected: 07/17/25 0437    Specimen: Blood Updated: 07/17/25 0447     WBC 6.60 10*3/mm3      RBC 4.04 10*6/mm3      Hemoglobin 11.7 g/dL      Hematocrit 38.6 %      MCV 95.5 fL      MCH 29.0 pg      MCHC 30.3 g/dL      RDW 13.2 %      RDW-SD 46.0 fl      MPV 10.2 fL      Platelets 310 10*3/mm3      Neutrophil % 55.1 %      Lymphocyte % 30.5 %      Monocyte % 9.2 %       Eosinophil % 3.9 %      Basophil % 1.1 %      Immature Grans % 0.2 %      Neutrophils, Absolute 3.64 10*3/mm3      Lymphocytes, Absolute 2.01 10*3/mm3      Monocytes, Absolute 0.61 10*3/mm3      Eosinophils, Absolute 0.26 10*3/mm3      Basophils, Absolute 0.07 10*3/mm3      Immature Grans, Absolute 0.01 10*3/mm3      nRBC 0.0 /100 WBC             Imaging Results (Most Recent)       Procedure Component Value Units Date/Time    XR Tibia Fibula 2 View Left [[id number]] Collected: 07/16/25 1206     Updated: 07/16/25 1210    Narrative:      XR TIBIA FIBULA 2 VW LEFT    Date of Exam: 7/16/2025 11:48 AM EDT    Indication: wound anterior LLE shin with surrouning redness    Comparison: Left tibia and fibula radiographs 7/12/2025    Findings:  No left tibial or fibular fracture. No periosteal reaction or convincing plain film evidence of osteomyelitis. The knee and ankle joints maintain satisfactory alignment. Corticated calcification inferior to the lateral malleolus may reflect sequelae of   remote trauma, and is unchanged. No retained radiopaque foreign body. Small plantar calcaneal spur. Mild degenerative change of the medial compartment of the left knee with joint space narrowing and small marginal osteophyte formation. No subcutaneous   gas. No retained radiopaque foreign body      Impression:      Impression:  No acute finding of the left tibia and fibula.      Electronically Signed: Kelly Mariscal MD    7/16/2025 12:07 PM EDT    Workstation ID: [id number]          reviewed    ECG/EMG Results (most recent)       Procedure Component Value Units Date/Time    Telemetry Scan [[id number]] Resulted: 07/16/25 1512     Updated: 07/16/25 1519    Telemetry Scan [[id number]] Resulted: 07/16/25 2051     Updated: 07/16/25 2119    Telemetry Scan [[id number]] Resulted: 07/16/25 2336     Updated: 07/17/25 0035    Telemetry Scan [[id number]] Resulted: 07/17/25 0408     Updated: 07/17/25 0438    Telemetry Scan [[id number]] Resulted: 07/17/25  0720     Updated: 07/17/25 0806    Telemetry Scan [[id number]] Resulted: 07/17/25 1112     Updated: 07/17/25 1205    Telemetry Scan [[id number]] Resulted: 07/17/25 1528     Updated: 07/17/25 1615    ECG 12 Lead QT Measurement [[id number]] Collected: 07/17/25 1211     Updated: 07/17/25 1658     QT Interval 410 ms      QTC Interval 419 ms     Narrative:      HEART RATE=63  bpm  RR Bbzqhscy=357  ms  CA Gkphxufh=952  ms  P Horizontal Axis=-16  deg  P Front Axis=39  deg  QRSD Interval=94  ms  QT Zmisizov=750  ms  ECpW=639  ms  QRS Axis=8  deg  T Wave Axis=3  deg  - NORMAL ECG -  Sinus rhythm  No previous ECG available for comparison  Electronically Signed By: Aiden Quiros (AMY) 2025-07-17 16:54:56  Date and Time of Study:2025-07-17 12:11:32    Telemetry Scan [[id number]] Resulted: 07/17/25 2027     Updated: 07/17/25 2106    Telemetry Scan [[id number]] Resulted: 07/18/25 0014     Updated: 07/18/25 0037    Telemetry Scan [[id number]] Resulted: 07/18/25 0400     Updated: 07/18/25 0421    Telemetry Scan [[id number]] Resulted: 07/18/25 0740     Updated: 07/18/25 0751          reviewed        No results found for this or any previous visit.      Microbiology Results (last 10 days)       Procedure Component Value - Date/Time    Blood Culture - Blood, Arm, Left [[id number]]  (Normal) Collected: 07/16/25 1305    Lab Status: Preliminary result Specimen: Blood from Arm, Left Updated: 07/17/25 1315     Blood Culture No growth at 24 hours    Narrative:      Less than seven (7) mL's of blood was collected.  Insufficient quantity may yield false negative results.    Blood Culture - Blood, Arm, Right [[id number]]  (Normal) Collected: 07/16/25 1233    Lab Status: Preliminary result Specimen: Blood from Arm, Right Updated: 07/17/25 1245     Blood Culture No growth at 24 hours    Blood Culture - Blood, Arm, Left [[id number]]  (Abnormal) Collected: 07/12/25 2114    Lab Status: Preliminary result Specimen: Blood from Arm, Left Updated: 07/18/25 0644      Blood Culture Gram Negative Bacilli     Isolated from Aerobic Bottle     Gram Stain Aerobic Bottle Gram negative bacilli    Blood Culture ID, PCR - Blood, Arm, Left [[id number]] Collected: 07/12/25 2116    Lab Status: Final result Specimen: Blood from Arm, Left Updated: 07/15/25 1924     BCID, PCR Negative by BCID PCR. Culture to Follow.     BOTTLE TYPE Aerobic Bottle    Narrative:            Wound Culture - Wound, Leg, Right [[id number]]  (Abnormal)  (Susceptibility) Collected: 07/12/25 2106    Lab Status: Final result Specimen: Wound from Leg, Right Updated: 07/15/25 0843     Wound Culture Scant growth (1+) Escherichia coli      Scant growth (1+) Klebsiella aerogenes     Comment:   This organism may develop resistance during prolonged therapy with 3rd generation cephalosporins (e.g. ceftriaxone) as a result of de-repression of AmpC B-lactamase.  Ceftriaxone may be a reasonable treatment option for uncomplicated cystitis or other lower severity infections when susceptibility is demonstrated.         Scant growth (1+) Normal Skin Nimco     Gram Stain Few (2+) WBCs per low power field      No organisms seen    Susceptibility        Escherichia coli      LAVELLE      Amoxicillin + Clavulanate Susceptible      Ampicillin Susceptible      Ampicillin + Sulbactam Susceptible      Cefazolin (Non Urine) Susceptible      Cefepime Susceptible      Ceftazidime Susceptible      Ceftriaxone Susceptible      Cefuroxime axetil Susceptible      Gentamicin Susceptible      Levofloxacin Susceptible      Piperacillin + Tazobactam Susceptible      Tetracycline Susceptible      Trimethoprim + Sulfamethoxazole Susceptible                       Susceptibility        Klebsiella aerogenes      LAVELLE      Cefepime Susceptible      Ceftazidime Susceptible      Ceftriaxone Susceptible      Gentamicin Susceptible      Levofloxacin Susceptible      Trimethoprim + Sulfamethoxazole Susceptible                       Susceptibility Comments        Escherichia coli    With the exception of urinary-sourced infections, aminoglycosides should not be used as monotherapy.      Klebsiella aerogenes    With the exception of urinary-sourced infections, aminoglycosides should not be used as monotherapy.               Blood Culture - Blood, Arm, Right [[id number]]  (Normal) Collected: 07/12/25 2105    Lab Status: Final result Specimen: Blood from Arm, Right Updated: 07/17/25 2116     Blood Culture No growth at 5 days            Assessment & Plan     Cellulitis          Cellulitis  - cbc and bmp unremarkable  - prior wound cx ecoli, kleb  - 1 blood cx from outside hospital was + and growing gram - bacteria  - 2 repeat cx were done here 7/16 and have no growh at 24 hrs  - levaquin started due to pt losing iv and inability to obtain new one- EKG obtained qt 410  - 7/18 - blood cx from 7/12 still pending final results. So far showing susceptible to levaquin.   - pt has no complaints and noted improvement of symptoms.      Hypertension  -moderately Controlled   BP Readings from Last 1 Encounters:   07/18/25 133/84   - Continue losartan, atenolol  - Monitor while admitted      Depression  - seroquel    I discussed the patients findings and my recommendations with patient and nursing staff.     Discharge Diagnosis:      Cellulitis      Hospital Course  Patient is a 45 y.o. female presented with left lower extremity pain and erythema as noted in HPI above.  Patient had been treated with antibiotic prescribed by PCP without much improvement.  Blood culture from outside hospital on 7/12 showing positive.  Repeat blood culture on 7/16 negative.  Patient was admitted to the observation unit for monitoring IV antibiotic.  She noted improvement of symptoms and was discharged on Levaquin for 7 days.  Preliminary blood culture from 7/12 susceptible for Levaquin. At this time, patient felt to be in good condition for discharge with close follow up with PCP. Instructed to take all  medications as prescribed and to return to ED if any concerning signs/symptoms. All test/lab results were discussed with patient. All questions were answered and patient verbalizes understanding.       Past Medical History:     Past Medical History:   Diagnosis Date    Anxiety     Binge eating disorder     Chronic back pain     Depression     GERD (gastroesophageal reflux disease)     Hypertension     Kidney stones        Past Surgical History:     Past Surgical History:   Procedure Laterality Date    APPENDECTOMY      EXTRACORPOREAL SHOCKWAVE LITHOTRIPSY (ESWL), STENT INSERTION/REMOVAL      LAPAROSCOPIC CHOLECYSTECTOMY         Social History:   Social History     Socioeconomic History    Marital status: Single   Tobacco Use    Smoking status: Never    Smokeless tobacco: Never   Vaping Use    Vaping status: Never Used   Substance and Sexual Activity    Alcohol use: Never    Drug use: Never    Sexual activity: Not Currently       Procedures Performed         Consults:   Consults       No orders found from 6/17/2025 to 7/17/2025.            Condition on Discharge:     Stable    Discharge Disposition  Home or Self Care    Discharge Medications     Discharge Medications        Continue These Medications        Instructions Start Date   atenolol 100 MG tablet  Commonly known as: TENORMIN   100 mg, Oral, 2 Times Daily      buPROPion  MG 12 hr tablet  Commonly known as: WELLBUTRIN SR   150 mg, 2 Times Daily      diphenoxylate-atropine 2.5-0.025 MG per tablet  Commonly known as: LOMOTIL   1 tablet, Oral, 4 Times Daily PRN      esomeprazole 40 MG capsule  Commonly known as: nexIUM   40 mg, 2 Times Daily      FLUoxetine 40 MG capsule  Commonly known as: PROzac   40 mg, Oral, Every Evening      losartan 25 MG tablet  Commonly known as: Cozaar   25 mg, Oral, Daily      montelukast 10 MG tablet  Commonly known as: SINGULAIR   10 mg, Nightly      oxyCODONE-acetaminophen  MG per tablet  Commonly known as: PERCOCET   1  tablet, Every 6 Hours PRN      propranolol 20 MG tablet  Commonly known as: INDERAL   20 mg, 2 Times Daily      QUEtiapine 200 MG tablet  Commonly known as: SEROquel   200 mg, Nightly      QUEtiapine 100 MG tablet  Commonly known as: SEROquel   100 mg, Nightly      sulfamethoxazole-trimethoprim 800-160 MG per tablet  Commonly known as: BACTRIM DS,SEPTRA DS   1 tablet, Oral, 2 Times Daily      tiZANidine 4 MG tablet  Commonly known as: ZANAFLEX   1-2 tablets, Every 8 Hours PRN      triamcinolone 0.1 % cream  Commonly known as: KENALOG   1 Application, 2 Times Daily      Vyvanse 70 MG capsule  Generic drug: lisdexamfetamine   70 mg, Daily PRN               Discharge Diet:     Activity at Discharge:   Activity Instructions       Work Restrictions      Type of Restriction: Work    May Return to Work: Other    Return to Work Instructions: may return to work on sunday            Follow-up Appointments  No future appointments.  Additional Instructions for the Follow-ups that You Need to Schedule       Discharge Follow-up with PCP   As directed       Currently Documented PCP:    Mis iLnton APRN    PCP Phone Number:    [phone]     Follow Up Details: 2 weeks                Test Results Pending at Discharge  Pending Results       None             Risk for Readmission (LACE) Score: 3 (7/18/2025  6:00 AM)      Greater than 30 minutes spent in discharge activities for this patient    Signature:Electronically signed by KINJAL Chicas, 07/18/25, 8:54 AM EDT.

## 2025-07-18 NOTE — PLAN OF CARE
Goal Outcome Evaluation:   Problem: Pain Acute  Goal: Optimal Pain Control and Function  Outcome: Met  Intervention: Develop Pain Management Plan  Recent Flowsheet Documentation  Taken 7/18/2025 1036 by Pablo Spain RN  Pain Management Interventions:   care clustered   diversional activity provided   quiet environment facilitated  Taken 7/18/2025 0940 by Pablo Spain RN  Pain Management Interventions: pain medication given  Taken 7/18/2025 0830 by Pablo Spain RN  Pain Management Interventions:   pain management plan reviewed with patient/caregiver   pillow support provided   position adjusted   care clustered   diversional activity provided   quiet environment facilitated  Intervention: Prevent or Manage Pain  Recent Flowsheet Documentation  Taken 7/18/2025 0830 by Pablo Spain RN  Medication Review/Management: medications reviewed

## 2025-07-18 NOTE — PLAN OF CARE
Problem: Pain Acute  Goal: Optimal Pain Control and Function  Outcome: Progressing  Intervention: Develop Pain Management Plan  Recent Flowsheet Documentation  Taken 7/17/2025 9725 by Janine Castillo RN  Pain Management Interventions: pain medication given  Taken 7/17/2025 1908 by Janine Castillo, RN  Pain Management Interventions: pain medication given   Goal Outcome Evaluation:  Plan of Care Reviewed With: patient        Progress: improving

## 2025-07-19 LAB
BACTERIA SPEC AEROBE CULT: ABNORMAL
GRAM STN SPEC: ABNORMAL
ISOLATED FROM: ABNORMAL

## 2025-07-21 LAB
BACTERIA SPEC AEROBE CULT: NORMAL
BACTERIA SPEC AEROBE CULT: NORMAL

## 2025-07-28 LAB
BACTERIA SPEC AEROBE CULT: ABNORMAL
GRAM STN SPEC: ABNORMAL
ISOLATED FROM: ABNORMAL